# Patient Record
Sex: FEMALE | Race: OTHER | HISPANIC OR LATINO
[De-identification: names, ages, dates, MRNs, and addresses within clinical notes are randomized per-mention and may not be internally consistent; named-entity substitution may affect disease eponyms.]

---

## 2017-03-08 ENCOUNTER — APPOINTMENT (OUTPATIENT)
Dept: PULMONOLOGY | Facility: CLINIC | Age: 37
End: 2017-03-08

## 2017-03-08 VITALS
WEIGHT: 184 LBS | SYSTOLIC BLOOD PRESSURE: 112 MMHG | BODY MASS INDEX: 33.65 KG/M2 | HEART RATE: 88 BPM | OXYGEN SATURATION: 98 % | DIASTOLIC BLOOD PRESSURE: 66 MMHG

## 2017-03-08 DIAGNOSIS — R09.82 POSTNASAL DRIP: ICD-10-CM

## 2017-07-12 ENCOUNTER — RX ONLY (RX ONLY)
Age: 37
End: 2017-07-12

## 2017-08-24 ENCOUNTER — RX ONLY (RX ONLY)
Age: 37
End: 2017-08-24

## 2017-09-08 ENCOUNTER — RX ONLY (RX ONLY)
Age: 37
End: 2017-09-08

## 2017-09-13 ENCOUNTER — RX ONLY (RX ONLY)
Age: 37
End: 2017-09-13

## 2017-09-22 ENCOUNTER — RX ONLY (RX ONLY)
Age: 37
End: 2017-09-22

## 2017-11-08 ENCOUNTER — APPOINTMENT (OUTPATIENT)
Dept: PULMONOLOGY | Facility: CLINIC | Age: 37
End: 2017-11-08

## 2017-11-22 ENCOUNTER — APPOINTMENT (OUTPATIENT)
Dept: PULMONOLOGY | Facility: CLINIC | Age: 37
End: 2017-11-22
Payer: COMMERCIAL

## 2017-11-22 VITALS
OXYGEN SATURATION: 98 % | BODY MASS INDEX: 35.48 KG/M2 | DIASTOLIC BLOOD PRESSURE: 72 MMHG | HEART RATE: 79 BPM | WEIGHT: 194 LBS | SYSTOLIC BLOOD PRESSURE: 108 MMHG | RESPIRATION RATE: 16 BRPM

## 2017-11-22 DIAGNOSIS — R91.1 SOLITARY PULMONARY NODULE: ICD-10-CM

## 2017-11-22 DIAGNOSIS — J30.9 ALLERGIC RHINITIS, UNSPECIFIED: ICD-10-CM

## 2017-11-22 DIAGNOSIS — R06.02 SHORTNESS OF BREATH: ICD-10-CM

## 2017-11-22 DIAGNOSIS — R05 COUGH: ICD-10-CM

## 2017-11-22 DIAGNOSIS — E66.9 OBESITY, UNSPECIFIED: ICD-10-CM

## 2017-11-22 PROCEDURE — 99214 OFFICE O/P EST MOD 30 MIN: CPT

## 2017-12-20 ENCOUNTER — RX ONLY (RX ONLY)
Age: 37
End: 2017-12-20

## 2018-01-11 ENCOUNTER — RX ONLY (RX ONLY)
Age: 38
End: 2018-01-11

## 2018-07-16 ENCOUNTER — APPOINTMENT (OUTPATIENT)
Dept: VASCULAR SURGERY | Facility: CLINIC | Age: 38
End: 2018-07-16

## 2018-07-18 ENCOUNTER — APPOINTMENT (OUTPATIENT)
Dept: DERMATOLOGY | Facility: CLINIC | Age: 38
End: 2018-07-18
Payer: COMMERCIAL

## 2018-07-18 VITALS
BODY MASS INDEX: 31.89 KG/M2 | SYSTOLIC BLOOD PRESSURE: 100 MMHG | HEIGHT: 63 IN | DIASTOLIC BLOOD PRESSURE: 72 MMHG | WEIGHT: 180 LBS

## 2018-07-18 DIAGNOSIS — Z91.89 OTHER SPECIFIED PERSONAL RISK FACTORS, NOT ELSEWHERE CLASSIFIED: ICD-10-CM

## 2018-07-18 DIAGNOSIS — D23.9 OTHER BENIGN NEOPLASM OF SKIN, UNSPECIFIED: ICD-10-CM

## 2018-07-18 PROCEDURE — 99202 OFFICE O/P NEW SF 15 MIN: CPT

## 2018-11-08 ENCOUNTER — APPOINTMENT (OUTPATIENT)
Dept: PHYSICAL MEDICINE AND REHAB | Facility: CLINIC | Age: 38
End: 2018-11-08

## 2019-07-25 ENCOUNTER — APPOINTMENT (OUTPATIENT)
Dept: RHEUMATOLOGY | Facility: CLINIC | Age: 39
End: 2019-07-25
Payer: COMMERCIAL

## 2019-07-25 VITALS
BODY MASS INDEX: 32.6 KG/M2 | WEIGHT: 184 LBS | TEMPERATURE: 98.6 F | OXYGEN SATURATION: 98 % | HEIGHT: 63 IN | RESPIRATION RATE: 17 BRPM | DIASTOLIC BLOOD PRESSURE: 64 MMHG | SYSTOLIC BLOOD PRESSURE: 118 MMHG | HEART RATE: 65 BPM

## 2019-07-25 DIAGNOSIS — Z82.61 FAMILY HISTORY OF ARTHRITIS: ICD-10-CM

## 2019-07-25 DIAGNOSIS — Z82.49 FAMILY HISTORY OF ISCHEMIC HEART DISEASE AND OTHER DISEASES OF THE CIRCULATORY SYSTEM: ICD-10-CM

## 2019-07-25 DIAGNOSIS — Z83.3 FAMILY HISTORY OF DIABETES MELLITUS: ICD-10-CM

## 2019-07-25 DIAGNOSIS — R73.03 PREDIABETES.: ICD-10-CM

## 2019-07-25 PROCEDURE — 99205 OFFICE O/P NEW HI 60 MIN: CPT

## 2019-07-25 RX ORDER — SIMVASTATIN 20 MG/1
20 TABLET, FILM COATED ORAL
Refills: 0 | Status: COMPLETED | COMMUNITY
End: 2019-07-25

## 2019-07-25 RX ORDER — ERGOCALCIFEROL 1.25 MG/1
1.25 MG CAPSULE, LIQUID FILLED ORAL
Refills: 0 | Status: ACTIVE | COMMUNITY
Start: 2019-07-25

## 2019-07-25 RX ORDER — TRIAMCINOLONE ACETONIDE 1 MG/G
0.1 OINTMENT TOPICAL
Qty: 1 | Refills: 1 | Status: COMPLETED | COMMUNITY
Start: 2018-07-18 | End: 2019-07-25

## 2019-07-25 NOTE — REASON FOR VISIT
[Consultation] : a consultation visit [Family Member] : family member [Patient Declined  Services] : - None: Patient declined  services

## 2019-07-25 NOTE — PHYSICAL EXAM
[General Appearance - Alert] : alert [General Appearance - In No Acute Distress] : in no acute distress [Sclera] : the sclera and conjunctiva were normal [Outer Ear] : the ears and nose were normal in appearance [Oropharynx] : the oropharynx was normal [Neck Appearance] : the appearance of the neck was normal [Neck Cervical Mass (___cm)] : no neck mass was observed [Jugular Venous Distention Increased] : there was no jugular-venous distention [Thyroid Diffuse Enlargement] : the thyroid was not enlarged [Thyroid Nodule] : there were no palpable thyroid nodules [Auscultation Breath Sounds / Voice Sounds] : lungs were clear to auscultation bilaterally [Heart Rate And Rhythm] : heart rate was normal and rhythm regular [Heart Sounds] : normal S1 and S2 [Heart Sounds Gallop] : no gallops [Murmurs] : no murmurs [Heart Sounds Pericardial Friction Rub] : no pericardial rub [Edema] : there was no peripheral edema [Bowel Sounds] : normal bowel sounds [Abdomen Soft] : soft [Abdomen Tenderness] : non-tender [Abdomen Mass (___ Cm)] : no abdominal mass palpated [Cervical Lymph Nodes Enlarged Posterior Bilaterally] : posterior cervical [Cervical Lymph Nodes Enlarged Anterior Bilaterally] : anterior cervical [Supraclavicular Lymph Nodes Enlarged Bilaterally] : supraclavicular [Skin Color & Pigmentation] : normal skin color and pigmentation [Skin Turgor] : normal skin turgor [] : no rash [No Focal Deficits] : no focal deficits [Oriented To Time, Place, And Person] : oriented to person, place, and time [Impaired Insight] : insight and judgment were intact [Affect] : the affect was normal [FreeTextEntry1] : No synovitis, (+)tenderness in right 2nd PIP;  full ROM in all joints;  (+)tenderness over left lateral epicondyle, (+)pain upon resisted wrist extension\par

## 2019-07-25 NOTE — HISTORY OF PRESENT ILLNESS
[Fatigue] : fatigue [Arthralgias] : arthralgias [Myalgias] : myalgias [FreeTextEntry1] : 38 year old female with PMHx as listed below reports that she has been experiencing diffuse "bone" pains for the past 2-3 years.  The pain is is constant, though worse with activity and better at rest.  She says the pain severe - 9 out of 10.  (+)intermittent swelling in her arms and legs.  (+)AM stiffness, usually lasting for several minutes.  She previously saw another rheumatologist, who diagnosed her with fibromyalgia.  She was treated with naproxen and gabapentin, but neither provided any relief.  She gets some temporary relief from Icy Hot.  No other known alleviating factors.\par In addition, pt c/o burning pain from her elbows down to her hands.  She also reports that she often has difficulty grasping objects in her hands because the arms feel "loose". \par Pt also c/o mid back pain for the past year.  The pain is constant, though worse with sitting or standing/walking and better with lying down.  No radiation.  (+)numbness/tingling in her B/L LE's.\par No F/C, no unintentional weight loss, no night sweats, no oral ulcers, no rashes, no alopecia, no photosensitivity, no dry eyes; (+)dry mouth, no Raynaud symptoms, no focal weakness, no dysphagia  [Anorexia] : no anorexia [Weight Loss] : no weight loss [Malaise] : no malaise [Fever] : no fever [Chills] : no chills [Malar Facial Rash] : no malar facial rash [Skin Lesions] : no lesions [Skin Nodules] : no skin nodules [Oral Ulcers] : no oral ulcers [Cough] : no cough [Dry Mouth] : no dry mouth [Dysphagia] : no dysphagia [Shortness of Breath] : no shortness of breath [Chest Pain] : no chest pain [Joint Swelling] : no joint swelling [Joint Warmth] : no joint warmth [Joint Deformity] : no joint deformity [Decreased ROM] : no decreased range of motion [Morning Stiffness] : no morning stiffness [Falls] : no falls [Dyspnea] : no dyspnea [Muscle Weakness] : no muscle weakness [Muscle Spasms] : no muscle spasms [Muscle Cramping] : no muscle cramping [Visual Changes] : no visual changes [Eye Pain] : no eye pain [Eye Redness] : no eye redness [Dry Eyes] : no dry eyes

## 2019-07-25 NOTE — ASSESSMENT
[FreeTextEntry1] : 38 year old female presents with:\par 1)  Diffuse pain and persistent fatigue:  I agree that her presentation is most suggestive of fibromyalgia.  However, as this is a diagnosis of exclusion, I am ordering some bloodwork to rule out other possible etiologies (e.g. thyroid disease, Sjogren's (pt c/o dry mouth)).\par 2)  B/L elbow pain (L>R):  most c/w lateral epicondylitis\par   - Minimize activities placing stress on the elbow\par   - ibuprofen prn\par   - ice packs\par   - tennis elbow band\par 3)  Mid-back pain:  no red flag symptoms, but will order w/u given duration of symptoms\par   - x-rays of T-spine.

## 2019-07-25 NOTE — CONSULT LETTER
[Dear  ___] : Dear  [unfilled], [Consult Letter:] : I had the pleasure of evaluating your patient, [unfilled]. [Please see my note below.] : Please see my note below. [Consult Closing:] : Thank you very much for allowing me to participate in the care of this patient.  If you have any questions, please do not hesitate to contact me. [Sincerely,] : Sincerely, [FreeTextEntry3] : Mike Husain MD\par Rheumatology\par [facility]\par  of Medicine\par [employer] [facility] \par \par [address] [facility] [address]\par phone:  [phone]\par fax:      [phone]\par \par [address] [place] [address]\[place] [address]\par phone:  [phone]\par fax:      [phone]\par

## 2019-10-17 ENCOUNTER — APPOINTMENT (OUTPATIENT)
Dept: RHEUMATOLOGY | Facility: CLINIC | Age: 39
End: 2019-10-17
Payer: COMMERCIAL

## 2019-10-17 VITALS
RESPIRATION RATE: 17 BRPM | DIASTOLIC BLOOD PRESSURE: 70 MMHG | SYSTOLIC BLOOD PRESSURE: 116 MMHG | TEMPERATURE: 98.1 F | HEIGHT: 63 IN | OXYGEN SATURATION: 97 % | BODY MASS INDEX: 32.6 KG/M2 | WEIGHT: 184 LBS | HEART RATE: 72 BPM

## 2019-10-17 DIAGNOSIS — R52 PAIN, UNSPECIFIED: ICD-10-CM

## 2019-10-17 DIAGNOSIS — M79.7 FIBROMYALGIA: ICD-10-CM

## 2019-10-17 DIAGNOSIS — M77.12 LATERAL EPICONDYLITIS, LEFT ELBOW: ICD-10-CM

## 2019-10-17 DIAGNOSIS — M54.9 DORSALGIA, UNSPECIFIED: ICD-10-CM

## 2019-10-17 DIAGNOSIS — R53.83 OTHER FATIGUE: ICD-10-CM

## 2019-10-17 DIAGNOSIS — R68.2 DRY MOUTH, UNSPECIFIED: ICD-10-CM

## 2019-10-17 PROCEDURE — 99214 OFFICE O/P EST MOD 30 MIN: CPT

## 2019-10-17 RX ORDER — TOLTERODINE TARTARATE 2 MG/1
2 TABLET ORAL
Refills: 0 | Status: ACTIVE | COMMUNITY

## 2019-10-17 RX ORDER — PANTOPRAZOLE 40 MG/1
40 TABLET, DELAYED RELEASE ORAL
Refills: 0 | Status: ACTIVE | COMMUNITY
Start: 2019-10-17

## 2019-10-17 RX ORDER — OMEPRAZOLE 40 MG/1
40 CAPSULE, DELAYED RELEASE ORAL
Refills: 0 | Status: COMPLETED | COMMUNITY
Start: 2019-07-25 | End: 2019-10-17

## 2019-10-17 RX ORDER — CHLORHEXIDINE GLUCONATE 4 %
325 (65 FE) LIQUID (ML) TOPICAL
Refills: 0 | Status: ACTIVE | COMMUNITY

## 2019-10-17 RX ORDER — SOLIFENACIN SUCCINATE 10 MG/1
10 TABLET ORAL
Refills: 0 | Status: COMPLETED | COMMUNITY
Start: 2019-07-25 | End: 2019-10-17

## 2019-10-17 NOTE — ASSESSMENT
[FreeTextEntry1] : 39 year old female presents with:\par 1)  Diffuse pain and persistent fatigue: most c/w fibromyalgia.  w/u for other possible etiologies negative.\par   - Reiterated importance of exercise.\par   - Start Lyrica 75mg BID.  [facility] registry consulted - Reference #: [id number].  (previously didn't tolerate gabapentin).\par   - sleep hygiene.\par 2)  B/L elbow pain (L>R):  most c/w lateral epicondylitis\par   - Minimize activities placing stress on the elbow\par   - ibuprofen prn\par   - ice packs\par   - tennis elbow band\par 3)  Mid-back pain:  no red flag symptoms, but will order w/u given duration of symptoms\par   - Back exercises.\par   - ibuprofen prn\par   - warm compresses\par   - OTC topical analgesics\par   - If no improvement by next visit, will consider MRI.

## 2019-10-17 NOTE — REASON FOR VISIT
[Follow-Up: _____] : a [unfilled] follow-up visit [Family Member] : family member [Patient Declined  Services] : - None: Patient declined  services

## 2019-10-17 NOTE — HISTORY OF PRESENT ILLNESS
[Fatigue] : fatigue [Arthralgias] : arthralgias [Myalgias] : myalgias [FreeTextEntry1] : Feeling "the same" since last visit.  Still w/ diffuse pain, worst in the mid-back, unchanged.  Pt reports that the pain makes it difficult to perform ADL's.  Has not yet tried exercising.  Also still w/ right elbow pain, unchanged. [Anorexia] : no anorexia [Weight Loss] : no weight loss [Malaise] : no malaise [Fever] : no fever [Malar Facial Rash] : no malar facial rash [Chills] : no chills [Skin Lesions] : no lesions [Skin Nodules] : no skin nodules [Oral Ulcers] : no oral ulcers [Cough] : no cough [Dry Mouth] : no dry mouth [Dysphagia] : no dysphagia [Shortness of Breath] : no shortness of breath [Chest Pain] : no chest pain [Joint Swelling] : no joint swelling [Joint Warmth] : no joint warmth [Joint Deformity] : no joint deformity [Decreased ROM] : no decreased range of motion [Falls] : no falls [Morning Stiffness] : no morning stiffness [Muscle Weakness] : no muscle weakness [Dyspnea] : no dyspnea [Muscle Cramping] : no muscle cramping [Muscle Spasms] : no muscle spasms [Eye Pain] : no eye pain [Visual Changes] : no visual changes [Eye Redness] : no eye redness [Dry Eyes] : no dry eyes

## 2019-10-17 NOTE — DATA REVIEWED
[FreeTextEntry1] : CBC, CMP unremarkable\par MARY negative\par SSA/SSB negative\par TSH 0.814\par ESR 16\par CRP 5 mg/L\par \par x-rays T-spine:  mild dextroscoliosis

## 2019-10-17 NOTE — PHYSICAL EXAM
[General Appearance - Alert] : alert [General Appearance - In No Acute Distress] : in no acute distress [Outer Ear] : the ears and nose were normal in appearance [Sclera] : the sclera and conjunctiva were normal [Oropharynx] : the oropharynx was normal [Neck Appearance] : the appearance of the neck was normal [Neck Cervical Mass (___cm)] : no neck mass was observed [Jugular Venous Distention Increased] : there was no jugular-venous distention [Thyroid Diffuse Enlargement] : the thyroid was not enlarged [Thyroid Nodule] : there were no palpable thyroid nodules [Auscultation Breath Sounds / Voice Sounds] : lungs were clear to auscultation bilaterally [Heart Sounds] : normal S1 and S2 [Heart Rate And Rhythm] : heart rate was normal and rhythm regular [Heart Sounds Gallop] : no gallops [Murmurs] : no murmurs [Heart Sounds Pericardial Friction Rub] : no pericardial rub [Bowel Sounds] : normal bowel sounds [Edema] : there was no peripheral edema [Abdomen Soft] : soft [Abdomen Tenderness] : non-tender [Abdomen Mass (___ Cm)] : no abdominal mass palpated [Cervical Lymph Nodes Enlarged Posterior Bilaterally] : posterior cervical [Cervical Lymph Nodes Enlarged Anterior Bilaterally] : anterior cervical [Supraclavicular Lymph Nodes Enlarged Bilaterally] : supraclavicular [] : no rash [Skin Color & Pigmentation] : normal skin color and pigmentation [Skin Turgor] : normal skin turgor [Oriented To Time, Place, And Person] : oriented to person, place, and time [No Focal Deficits] : no focal deficits [Impaired Insight] : insight and judgment were intact [Affect] : the affect was normal [FreeTextEntry1] : No synovitis, (+)tenderness in right 2nd PIP;  full ROM in all joints;  (+)tenderness over left lateral epicondyle, (+)pain upon resisted wrist extension\par

## 2020-02-18 ENCOUNTER — APPOINTMENT (OUTPATIENT)
Dept: RHEUMATOLOGY | Facility: CLINIC | Age: 40
End: 2020-02-18

## 2020-12-15 ENCOUNTER — APPOINTMENT (OUTPATIENT)
Dept: GYNECOLOGIC ONCOLOGY | Facility: CLINIC | Age: 40
End: 2020-12-15
Payer: COMMERCIAL

## 2020-12-15 VITALS
OXYGEN SATURATION: 99 % | HEIGHT: 63 IN | WEIGHT: 184 LBS | DIASTOLIC BLOOD PRESSURE: 80 MMHG | RESPIRATION RATE: 16 BRPM | SYSTOLIC BLOOD PRESSURE: 126 MMHG | HEART RATE: 62 BPM | BODY MASS INDEX: 32.6 KG/M2

## 2020-12-15 DIAGNOSIS — Z80.8 FAMILY HISTORY OF MALIGNANT NEOPLASM OF OTHER ORGANS OR SYSTEMS: ICD-10-CM

## 2020-12-15 DIAGNOSIS — N92.6 IRREGULAR MENSTRUATION, UNSPECIFIED: ICD-10-CM

## 2020-12-15 DIAGNOSIS — D25.9 LEIOMYOMA OF UTERUS, UNSPECIFIED: ICD-10-CM

## 2020-12-15 DIAGNOSIS — R10.2 PELVIC AND PERINEAL PAIN: ICD-10-CM

## 2020-12-15 PROCEDURE — 76830 TRANSVAGINAL US NON-OB: CPT | Mod: 59

## 2020-12-15 PROCEDURE — 99072 ADDL SUPL MATRL&STAF TM PHE: CPT

## 2020-12-15 PROCEDURE — 99205 OFFICE O/P NEW HI 60 MIN: CPT | Mod: 25

## 2020-12-15 PROCEDURE — 76857 US EXAM PELVIC LIMITED: CPT | Mod: 59

## 2020-12-15 RX ORDER — METFORMIN HYDROCHLORIDE 1000 MG/1
1000 TABLET, COATED ORAL
Refills: 0 | Status: DISCONTINUED | COMMUNITY
End: 2020-12-15

## 2020-12-15 RX ORDER — SOLIFENACIN SUCCINATE 10 MG/1
10 TABLET ORAL
Refills: 0 | Status: DISCONTINUED | COMMUNITY
Start: 2019-10-17 | End: 2020-12-15

## 2020-12-15 RX ORDER — PREGABALIN 75 MG/1
75 CAPSULE ORAL
Qty: 60 | Refills: 2 | Status: DISCONTINUED | COMMUNITY
Start: 2019-10-17 | End: 2020-12-15

## 2020-12-15 NOTE — HISTORY OF PRESENT ILLNESS
[FreeTextEntry1] : This 39yo ,  x 1, c/s x 2 LMP 20 referred by Dr. Moon for fibroids, dysmenorrhea and irregular menses. Pt reports menses occurring twice a month at times with abnormal spotting between periods. Complains of pelvic pressure, discomfort and urinary frequency with occasional leakage. Admits to occasional dyspareunia. Endometrial biopsy on 20 revealed benign endocervical glandular tissue with squamous metaplasia, non diagnostic endometrial tissue. Pt wants to discuss her options.\par \par Pap [id number] wnl \par Mammo-2020-reports wnl, cysts s/p FNA-benign \par  \par

## 2020-12-15 NOTE — ASSESSMENT
[FreeTextEntry1] : 39yo female with intermenstrual bleeding and irregular menses, fibroids and suspected adenomyosis. \par \par Discussed treatment options including D&C, hysteroscopy followed by either MIRENA IUD or RA RADHA MITCHELL. In the interim, pt will discuss options over with her . She understands the need for a D&C to rule out precancerous or cancerous cells. She understands that her pain may not be improved with the IUD and she may need a hysterectomy in the future. \par \par I discussed at length with the patient the nature, purpose, risks, benefits, and alternatives to dilation and curettage, hysteroscopy. She understands the risks to include (but not be limited to): uterine perforation with possible need for laparoscopy and/or laparotomy; infection with need for hospitalization; fluid overload with possible critical illness as a consequence; and bleeding with need for transfusion.  The patient agrees to proceed.\par

## 2020-12-15 NOTE — CHIEF COMPLAINT
[FreeTextEntry1] : [facility]\par \par [facility] [phone] at [address] [place] [id number]\par

## 2020-12-15 NOTE — PHYSICAL EXAM
[Abnormal] : Uterus: Abnormal [Tender] : tender [Normal] : Bimanual Exam: Normal [de-identified] : Patient was interviewed and examined with chaperone present. Name of chaperone: Candi Brown

## 2020-12-15 NOTE — END OF VISIT
[FreeTextEntry3] : Written by Candi BELLE, acting as a scribe for Dr. Xavier Cole.\par This note accurately reflects the work and decisions made by me.\par

## 2021-01-27 ENCOUNTER — FORM ENCOUNTER (OUTPATIENT)
Age: 41
End: 2021-01-27

## 2021-01-28 DIAGNOSIS — Z01.818 ENCOUNTER FOR OTHER PREPROCEDURAL EXAMINATION: ICD-10-CM

## 2021-02-03 ENCOUNTER — APPOINTMENT (OUTPATIENT)
Dept: DISASTER EMERGENCY | Facility: CLINIC | Age: 41
End: 2021-02-03

## 2021-04-01 ENCOUNTER — OUTPATIENT (OUTPATIENT)
Dept: OUTPATIENT SERVICES | Facility: HOSPITAL | Age: 41
LOS: 1 days | End: 2021-04-01
Payer: COMMERCIAL

## 2021-04-01 DIAGNOSIS — Z01.818 ENCOUNTER FOR OTHER PREPROCEDURAL EXAMINATION: ICD-10-CM

## 2021-04-01 LAB
ANION GAP SERPL CALC-SCNC: 12 MMOL/L — SIGNIFICANT CHANGE UP (ref 5–17)
BASOPHILS # BLD AUTO: 0.03 K/UL — SIGNIFICANT CHANGE UP (ref 0–0.2)
BASOPHILS NFR BLD AUTO: 0.6 % — SIGNIFICANT CHANGE UP (ref 0–2)
BUN SERPL-MCNC: 12 MG/DL — SIGNIFICANT CHANGE UP (ref 8–20)
CALCIUM SERPL-MCNC: 8.9 MG/DL — SIGNIFICANT CHANGE UP (ref 8.6–10.2)
CHLORIDE SERPL-SCNC: 104 MMOL/L — SIGNIFICANT CHANGE UP (ref 98–107)
CO2 SERPL-SCNC: 24 MMOL/L — SIGNIFICANT CHANGE UP (ref 22–29)
CREAT SERPL-MCNC: 0.52 MG/DL — SIGNIFICANT CHANGE UP (ref 0.5–1.3)
EOSINOPHIL # BLD AUTO: 0.05 K/UL — SIGNIFICANT CHANGE UP (ref 0–0.5)
EOSINOPHIL NFR BLD AUTO: 1.1 % — SIGNIFICANT CHANGE UP (ref 0–6)
GLUCOSE SERPL-MCNC: 95 MG/DL — SIGNIFICANT CHANGE UP (ref 70–99)
HCT VFR BLD CALC: 33.6 % — LOW (ref 34.5–45)
HGB BLD-MCNC: 10.7 G/DL — LOW (ref 11.5–15.5)
IMM GRANULOCYTES NFR BLD AUTO: 0.2 % — SIGNIFICANT CHANGE UP (ref 0–1.5)
LYMPHOCYTES # BLD AUTO: 2.31 K/UL — SIGNIFICANT CHANGE UP (ref 1–3.3)
LYMPHOCYTES # BLD AUTO: 48.7 % — HIGH (ref 13–44)
MCHC RBC-ENTMCNC: 24.8 PG — LOW (ref 27–34)
MCHC RBC-ENTMCNC: 31.8 GM/DL — LOW (ref 32–36)
MCV RBC AUTO: 77.8 FL — LOW (ref 80–100)
MONOCYTES # BLD AUTO: 0.38 K/UL — SIGNIFICANT CHANGE UP (ref 0–0.9)
MONOCYTES NFR BLD AUTO: 8 % — SIGNIFICANT CHANGE UP (ref 2–14)
NEUTROPHILS # BLD AUTO: 1.96 K/UL — SIGNIFICANT CHANGE UP (ref 1.8–7.4)
NEUTROPHILS NFR BLD AUTO: 41.4 % — LOW (ref 43–77)
PLATELET # BLD AUTO: 315 K/UL — SIGNIFICANT CHANGE UP (ref 150–400)
POTASSIUM SERPL-MCNC: 4 MMOL/L — SIGNIFICANT CHANGE UP (ref 3.5–5.3)
POTASSIUM SERPL-SCNC: 4 MMOL/L — SIGNIFICANT CHANGE UP (ref 3.5–5.3)
RBC # BLD: 4.32 M/UL — SIGNIFICANT CHANGE UP (ref 3.8–5.2)
RBC # FLD: 15.1 % — HIGH (ref 10.3–14.5)
SODIUM SERPL-SCNC: 140 MMOL/L — SIGNIFICANT CHANGE UP (ref 135–145)
WBC # BLD: 4.74 K/UL — SIGNIFICANT CHANGE UP (ref 3.8–10.5)
WBC # FLD AUTO: 4.74 K/UL — SIGNIFICANT CHANGE UP (ref 3.8–10.5)

## 2021-04-01 PROCEDURE — 84704 HCG FREE BETACHAIN TEST: CPT

## 2021-04-01 PROCEDURE — G0463: CPT

## 2021-04-01 PROCEDURE — 36415 COLL VENOUS BLD VENIPUNCTURE: CPT

## 2021-04-01 PROCEDURE — 80048 BASIC METABOLIC PNL TOTAL CA: CPT

## 2021-04-01 PROCEDURE — 85025 COMPLETE CBC W/AUTO DIFF WBC: CPT

## 2021-04-02 LAB — HCG-TM SERPL-ACNC: <1 MIU/ML — SIGNIFICANT CHANGE UP

## 2021-04-07 ENCOUNTER — APPOINTMENT (OUTPATIENT)
Dept: DISASTER EMERGENCY | Facility: CLINIC | Age: 41
End: 2021-04-07

## 2021-04-08 LAB — SARS-COV-2 N GENE NPH QL NAA+PROBE: NOT DETECTED

## 2021-04-12 ENCOUNTER — RESULT REVIEW (OUTPATIENT)
Age: 41
End: 2021-04-12

## 2021-05-04 ENCOUNTER — APPOINTMENT (OUTPATIENT)
Dept: GYNECOLOGIC ONCOLOGY | Facility: CLINIC | Age: 41
End: 2021-05-04
Payer: COMMERCIAL

## 2021-05-04 VITALS — HEIGHT: 63 IN | WEIGHT: 184 LBS | BODY MASS INDEX: 32.6 KG/M2

## 2021-05-04 DIAGNOSIS — N84.0 POLYP OF CORPUS UTERI: ICD-10-CM

## 2021-05-04 DIAGNOSIS — D25.0 SUBMUCOUS LEIOMYOMA OF UTERUS: ICD-10-CM

## 2021-05-04 PROCEDURE — 99212 OFFICE O/P EST SF 10 MIN: CPT

## 2021-05-04 PROCEDURE — 99072 ADDL SUPL MATRL&STAF TM PHE: CPT

## 2021-05-04 NOTE — ASSESSMENT
[FreeTextEntry1] : Pt is a 41 yo s/p resection of submucous leiomyoma and endometral polyps causing AUB. She reports improvement of her periods and would like to observe to see if she is happy with results. She will follow up if she desires additional treatment such as IUD mirena placement. We discussed importance of contraception as she can still get pregnant and she will think about options. She is using condoms and timed intercourse but she understands those are not as good. She recovered well after surgery. Pathology benign.

## 2021-05-04 NOTE — REASON FOR VISIT
[Post Op] : post op visit [de-identified] : 4/12/21 [de-identified] : D&C, myosure polypectomy and myomectomy [de-identified] : Pt presents for follow up. She reports she is feeling well and she reports having a period and it was heavy for only 2 days and normal the others, which is much better than previously.

## 2022-10-28 ENCOUNTER — RX ONLY (RX ONLY)
Age: 42
End: 2022-10-28

## 2022-10-28 ENCOUNTER — OFFICE (OUTPATIENT)
Dept: URBAN - METROPOLITAN AREA CLINIC 94 | Facility: CLINIC | Age: 42
Setting detail: OPHTHALMOLOGY
End: 2022-10-28
Payer: COMMERCIAL

## 2022-10-28 DIAGNOSIS — H04.123: ICD-10-CM

## 2022-10-28 DIAGNOSIS — E11.9: ICD-10-CM

## 2022-10-28 DIAGNOSIS — H43.393: ICD-10-CM

## 2022-10-28 DIAGNOSIS — Z96.1: ICD-10-CM

## 2022-10-28 PROCEDURE — 92250 FUNDUS PHOTOGRAPHY W/I&R: CPT | Performed by: OPHTHALMOLOGY

## 2022-10-28 PROCEDURE — 99204 OFFICE O/P NEW MOD 45 MIN: CPT | Performed by: OPHTHALMOLOGY

## 2022-10-28 ASSESSMENT — CONFRONTATIONAL VISUAL FIELD TEST (CVF)
OS_FINDINGS: FULL
OD_FINDINGS: FULL

## 2022-10-28 ASSESSMENT — KERATOMETRY
OD_K1POWER_DIOPTERS: 40.25
OS_AXISANGLE_DEGREES: 077
OD_AXISANGLE_DEGREES: 101
OS_K2POWER_DIOPTERS: 46.00
OD_K2POWER_DIOPTERS: 45.75
METHOD_AUTO_MANUAL: AUTO
OS_K1POWER_DIOPTERS: 39.75

## 2022-10-28 ASSESSMENT — REFRACTION_AUTOREFRACTION
OD_SPHERE: -0.25
OS_CYLINDER: -1.25
OS_SPHERE: -0.50
OD_AXIS: 163
OD_CYLINDER: -0.50
OS_AXIS: 008

## 2022-10-28 ASSESSMENT — REFRACTION_CURRENTRX
OD_OVR_VA: 20/
OD_CYLINDER: -6.75
OS_CYLINDER: -7.00
OS_SPHERE: +5.25
OD_VPRISM_DIRECTION: SV
OS_AXIS: 162
OS_VPRISM_DIRECTION: SV
OS_OVR_VA: 20/
OD_SPHERE: +5.50
OD_AXIS: 015

## 2022-10-28 ASSESSMENT — SUPERFICIAL PUNCTATE KERATITIS (SPK)
OD_SPK: T
OS_SPK: T

## 2022-10-28 ASSESSMENT — LID POSITION - COMMENTS
OS_COMMENTS: 10   BROW PTOSIS  8  MM, 3+ DERMATOCHALSIS, TEMPORAL HOODING
OD_COMMENTS: 10   BROW PTOSIS  8  MM, 3+ DERMATOCHALSIS, TEMPORAL HOODING
OD_COMMENTS: LEVATOR DEHISANCE.    MRD 1:  2 IPD:7   ULE:  &GT
OS_COMMENTS: LEVATOR DEHISANCE.    MRD 1:  2 IPD:7   ULE:  &GT

## 2022-10-28 ASSESSMENT — AXIALLENGTH_DERIVED
OS_AL: 24.2278
OS_AL: 24.3309
OD_AL: 23.9765
OD_AL: 24.7556
OS_AL: 24.2792

## 2022-10-28 ASSESSMENT — REFRACTION_MANIFEST
OS_VA1: 20/30-
OS_AXIS: 145
OD_VA1: 20/30
OD_SPHERE: +0.50
OS_AXIS: 142
OS_CYLINDER: -4.00
OS_VA1: 20/25
OD_CYLINDER: -5.75
OS_CYLINDER: -3.50
OS_SPHERE: +0.75
OS_SPHERE: +0.75
OD_AXIS: 10

## 2022-10-28 ASSESSMENT — SPHEQUIV_DERIVED
OD_SPHEQUIV: -2.375
OS_SPHEQUIV: -1
OS_SPHEQUIV: -1.25
OD_SPHEQUIV: -0.5
OS_SPHEQUIV: -1.125

## 2022-10-28 ASSESSMENT — VISUAL ACUITY
OD_BCVA: 20/40-1
OS_BCVA: 20/40

## 2022-10-28 ASSESSMENT — TONOMETRY
OD_IOP_MMHG: 13
OS_IOP_MMHG: 15

## 2023-04-28 ENCOUNTER — EMERGENCY (EMERGENCY)
Facility: HOSPITAL | Age: 43
LOS: 1 days | Discharge: HOME | End: 2023-04-28
Attending: STUDENT IN AN ORGANIZED HEALTH CARE EDUCATION/TRAINING PROGRAM
Payer: COMMERCIAL

## 2023-04-28 VITALS
RESPIRATION RATE: 18 BRPM | OXYGEN SATURATION: 100 % | HEART RATE: 76 BPM | SYSTOLIC BLOOD PRESSURE: 102 MMHG | DIASTOLIC BLOOD PRESSURE: 89 MMHG | HEIGHT: 63 IN | TEMPERATURE: 98 F | WEIGHT: 179.9 LBS

## 2023-04-28 LAB
ALBUMIN SERPL ELPH-MCNC: 4.4 G/DL — SIGNIFICANT CHANGE UP (ref 3.3–5.2)
ALP SERPL-CCNC: 71 U/L — SIGNIFICANT CHANGE UP (ref 40–120)
ALT FLD-CCNC: 13 U/L — SIGNIFICANT CHANGE UP
ANION GAP SERPL CALC-SCNC: 12 MMOL/L — SIGNIFICANT CHANGE UP (ref 5–17)
APPEARANCE UR: CLEAR — SIGNIFICANT CHANGE UP
AST SERPL-CCNC: 16 U/L — SIGNIFICANT CHANGE UP
BACTERIA # UR AUTO: ABNORMAL
BASOPHILS # BLD AUTO: 0.05 K/UL — SIGNIFICANT CHANGE UP (ref 0–0.2)
BASOPHILS NFR BLD AUTO: 0.8 % — SIGNIFICANT CHANGE UP (ref 0–2)
BILIRUB SERPL-MCNC: 0.3 MG/DL — LOW (ref 0.4–2)
BILIRUB UR-MCNC: NEGATIVE — SIGNIFICANT CHANGE UP
BUN SERPL-MCNC: 10 MG/DL — SIGNIFICANT CHANGE UP (ref 8–20)
CALCIUM SERPL-MCNC: 8.9 MG/DL — SIGNIFICANT CHANGE UP (ref 8.4–10.5)
CHLORIDE SERPL-SCNC: 103 MMOL/L — SIGNIFICANT CHANGE UP (ref 96–108)
CO2 SERPL-SCNC: 23 MMOL/L — SIGNIFICANT CHANGE UP (ref 22–29)
COLOR SPEC: YELLOW — SIGNIFICANT CHANGE UP
CREAT SERPL-MCNC: 0.58 MG/DL — SIGNIFICANT CHANGE UP (ref 0.5–1.3)
DIFF PNL FLD: ABNORMAL
EGFR: 114 ML/MIN/1.73M2 — SIGNIFICANT CHANGE UP
EOSINOPHIL # BLD AUTO: 0.03 K/UL — SIGNIFICANT CHANGE UP (ref 0–0.5)
EOSINOPHIL NFR BLD AUTO: 0.5 % — SIGNIFICANT CHANGE UP (ref 0–6)
EPI CELLS # UR: SIGNIFICANT CHANGE UP
GLUCOSE SERPL-MCNC: 100 MG/DL — HIGH (ref 70–99)
GLUCOSE UR QL: NEGATIVE MG/DL — SIGNIFICANT CHANGE UP
HCT VFR BLD CALC: 38.5 % — SIGNIFICANT CHANGE UP (ref 34.5–45)
HGB BLD-MCNC: 12.7 G/DL — SIGNIFICANT CHANGE UP (ref 11.5–15.5)
IMM GRANULOCYTES NFR BLD AUTO: 0.2 % — SIGNIFICANT CHANGE UP (ref 0–0.9)
KETONES UR-MCNC: NEGATIVE — SIGNIFICANT CHANGE UP
LEUKOCYTE ESTERASE UR-ACNC: NEGATIVE — SIGNIFICANT CHANGE UP
LYMPHOCYTES # BLD AUTO: 2.79 K/UL — SIGNIFICANT CHANGE UP (ref 1–3.3)
LYMPHOCYTES # BLD AUTO: 45.5 % — HIGH (ref 13–44)
MCHC RBC-ENTMCNC: 27.6 PG — SIGNIFICANT CHANGE UP (ref 27–34)
MCHC RBC-ENTMCNC: 33 GM/DL — SIGNIFICANT CHANGE UP (ref 32–36)
MCV RBC AUTO: 83.7 FL — SIGNIFICANT CHANGE UP (ref 80–100)
MONOCYTES # BLD AUTO: 0.43 K/UL — SIGNIFICANT CHANGE UP (ref 0–0.9)
MONOCYTES NFR BLD AUTO: 7 % — SIGNIFICANT CHANGE UP (ref 2–14)
NEUTROPHILS # BLD AUTO: 2.82 K/UL — SIGNIFICANT CHANGE UP (ref 1.8–7.4)
NEUTROPHILS NFR BLD AUTO: 46 % — SIGNIFICANT CHANGE UP (ref 43–77)
NITRITE UR-MCNC: NEGATIVE — SIGNIFICANT CHANGE UP
PH UR: 6 — SIGNIFICANT CHANGE UP (ref 5–8)
PLATELET # BLD AUTO: 222 K/UL — SIGNIFICANT CHANGE UP (ref 150–400)
POTASSIUM SERPL-MCNC: 3.8 MMOL/L — SIGNIFICANT CHANGE UP (ref 3.5–5.3)
POTASSIUM SERPL-SCNC: 3.8 MMOL/L — SIGNIFICANT CHANGE UP (ref 3.5–5.3)
PROT SERPL-MCNC: 7.2 G/DL — SIGNIFICANT CHANGE UP (ref 6.6–8.7)
PROT UR-MCNC: 30 MG/DL
RBC # BLD: 4.6 M/UL — SIGNIFICANT CHANGE UP (ref 3.8–5.2)
RBC # FLD: 13.8 % — SIGNIFICANT CHANGE UP (ref 10.3–14.5)
RBC CASTS # UR COMP ASSIST: SIGNIFICANT CHANGE UP /HPF (ref 0–4)
SODIUM SERPL-SCNC: 138 MMOL/L — SIGNIFICANT CHANGE UP (ref 135–145)
SP GR SPEC: 1.01 — SIGNIFICANT CHANGE UP (ref 1.01–1.02)
UROBILINOGEN FLD QL: NEGATIVE MG/DL — SIGNIFICANT CHANGE UP
WBC # BLD: 6.13 K/UL — SIGNIFICANT CHANGE UP (ref 3.8–10.5)
WBC # FLD AUTO: 6.13 K/UL — SIGNIFICANT CHANGE UP (ref 3.8–10.5)
WBC UR QL: SIGNIFICANT CHANGE UP /HPF (ref 0–5)

## 2023-04-28 PROCEDURE — 87086 URINE CULTURE/COLONY COUNT: CPT

## 2023-04-28 PROCEDURE — 76856 US EXAM PELVIC COMPLETE: CPT | Mod: 26

## 2023-04-28 PROCEDURE — 76830 TRANSVAGINAL US NON-OB: CPT

## 2023-04-28 PROCEDURE — 99284 EMERGENCY DEPT VISIT MOD MDM: CPT

## 2023-04-28 PROCEDURE — T1013: CPT

## 2023-04-28 PROCEDURE — 76830 TRANSVAGINAL US NON-OB: CPT | Mod: 26

## 2023-04-28 PROCEDURE — 80053 COMPREHEN METABOLIC PANEL: CPT

## 2023-04-28 PROCEDURE — 76856 US EXAM PELVIC COMPLETE: CPT

## 2023-04-28 PROCEDURE — 36415 COLL VENOUS BLD VENIPUNCTURE: CPT

## 2023-04-28 PROCEDURE — 85025 COMPLETE CBC W/AUTO DIFF WBC: CPT

## 2023-04-28 PROCEDURE — 96374 THER/PROPH/DIAG INJ IV PUSH: CPT

## 2023-04-28 PROCEDURE — 99284 EMERGENCY DEPT VISIT MOD MDM: CPT | Mod: 25

## 2023-04-28 PROCEDURE — 96375 TX/PRO/DX INJ NEW DRUG ADDON: CPT

## 2023-04-28 PROCEDURE — 81001 URINALYSIS AUTO W/SCOPE: CPT

## 2023-04-28 RX ORDER — KETOROLAC TROMETHAMINE 30 MG/ML
15 SYRINGE (ML) INJECTION ONCE
Refills: 0 | Status: DISCONTINUED | OUTPATIENT
Start: 2023-04-28 | End: 2023-04-28

## 2023-04-28 RX ORDER — ACETAMINOPHEN 500 MG
1000 TABLET ORAL ONCE
Refills: 0 | Status: COMPLETED | OUTPATIENT
Start: 2023-04-28 | End: 2023-04-28

## 2023-04-28 RX ADMIN — Medication 15 MILLIGRAM(S): at 10:26

## 2023-04-28 RX ADMIN — Medication 400 MILLIGRAM(S): at 10:28

## 2023-04-28 NOTE — ED STATDOCS - PROGRESS NOTE DETAILS
PT evaluated by intake physician. HPI/PE/ROS as noted above. Will follow up plan per intake physician Pt found to have L complex ovarian cyst and endometrial polyp on US which cancer cannot be excluded. advised need for close GYN f/u. Pt does not have GYN currently. She was advised to go to clinic in [place] and also will reach out to referrals coordinator to see if an appt can be set up. Pt advised of necessity of continued care to assess for cancer etc.  natacha. Abd soft and nontender on re-exam.

## 2023-04-28 NOTE — ED STATDOCS - OBJECTIVE STATEMENT
41 y/o female with PMHx of uterine fibroids presents to the ED c/o 3 days of lower abdomen pain, started on left side, gradually spread to suprapubic region, associated with dysuria, burning urination, flank pain. Pt also notes abnormal vaginal spotting as well.     : Stephanie

## 2023-04-28 NOTE — ED STATDOCS - PATIENT PORTAL LINK FT
You can access the FollowMyHealth Patient Portal offered by [facility] by registering at the following website: [email or web address]. By joining [facility]’s FollowMyHealth portal, you will also be able to view your health information using other applications (apps) compatible with our system.

## 2023-04-28 NOTE — ED STATDOCS - NSFOLLOWUPINSTRUCTIONS_ED_ALL_ED_FT
Infección urinaria en los adultos  Urinary Tract Infection, Adult       Yajaira infección urinaria (IU) puede ocurrir en cualquier lugar de las vías urinarias. Las vías urinarias incluyen a los riñones, los uréteres, la vejiga y la uretra. Estos órganos fabrican, almacenan y eliminan la orina del organismo.    El médico puede usar otras palabras para describir la infección. La IU diony afecta los uréteres y a los riñones (pielonefritis). La IU baja afecta la vejiga (cistitis) y la uretra (uretritis).    ¿Cuáles son las causas?  La mayoría de las infecciones de las vías urinarias es causada por la presencia de bacterias en la harley genital, alrededor de la entrada de las vías urinarias (uretra). Estas bacterias proliferan y causan inflamación en las vías urinarias.    ¿Qué incrementa el riesgo?  Es más probable que contraiga esta afección si:    Tiene colocado un catéter urinario (sonda urinaria) permanente.  No puede controlar cuándo orinar o defecar (tiene incontinencia).  Es abdiaziz y usted:    Utiliza espermicida o diafragma julian método anticonceptivo.  Tiene niveles bajos de estrógenos.  Están embarazadas.  Tiene ciertos genes que aumentan harris riesgo (genética).  Es sexualmente activa.  Anahi antibióticos.  Tiene yajaira afección que causa que el flujo de orina sea lento, julian:    Próstata agrandada, si usted es hombre.  Obstrucción de la uretra (estenosis).  Cálculo renal.  Yajaira afección nerviosa que afecta el control de la vejiga (vejiga neurógena).  No juan antonio lo suficiente o no orina con frecuencia.  Tiene ciertas enfermedades crónicas, julian:    Diabetes.  Un sistema que combate las enfermedades (sistemainmunitario) debilitado.  Anemia drepanocítica.  Gota.  Lesión en la médula smiley.    ¿Cuáles son los signos o los síntomas?  Los síntomas de esta afección incluyen:    Necesidad inmediata (urgente) de orinar.  Micción frecuente o eliminación de pequeñas cantidades de orina con frecuencia.  Ardor o dolor al orinar.  Presencia de chavez en la orina.  Orina con mal olor u olor atípico.  Dificultad para orinar.  Orina turbia.  Secreción vaginal, si es abdiaziz.  Dolor en el abdomen o en la parte inferior de la espalda.    Es posible que también tenga:    Vómitos o disminución del apetito.  Confusión.  Irritabilidad o cansancio.  Fiebre.  Diarrea.    El primer síntoma en los adultos mayores puede ser la confusión. En algunos casos, es posible que no tengan síntomas hasta que la infección empeore.    ¿Cómo se diagnostica?  Esta afección se diagnostica en función de mayo antecedentes médicos y de un examen físico. También pueden hacerle otras pruebas, incluidas las siguientes:    Análisis de orina.  Análisis de chavez.  Pruebas de infecciones de transmisión sexual (ITS).    Si ha tenido más de yajaira infección urinaria (IU), se pueden hacer estudios de diagnóstico por imágenes o yajaira cistoscopia para determinar la causa de las infecciones.    ¿Cómo se trata?  El tratamiento de esta afección incluye lo siguiente:    Antibióticos.  Medicamentos de venta keyur para aliviar las molestias.  Beber yajaira cantidad suficiente agua para mantenerse hidratado.    Si tiene infecciones con frecuencia o tiene otras afecciones, julian un cálculo renal, es posible que deba jose juan a un médico especialista en las vías urinarias (urólogo).    En casos poco frecuentes, las infecciones urinarias pueden provocar sepsis. La sepsis es yajaira afección potencialmente mortal que se produce cuando el cuerpo responde a yajaira infección. La sepsis se trata en el hospital con antibióticos, líquidos y otros medicamentos que se administran por vía intravenosa.    Sigue estas instrucciones en tu casa:         Medicamentos    Anahi los medicamentos de venta keyur y los recetados solamente julian se lo haya indicado el médico.  Si le recetaron un antibiótico, tómelo julian se lo haya indicado el médico. No deje de usar el antibiótico aunque comience a sentirse mejor.        Indicaciones generales    Asegúrese de hacer lo siguiente:    Vaciar la vejiga con frecuencia y en harris totalidad. No contener la orina laura largos períodos.  Vaciar la vejiga después de tener sexo.  Limpiarse de adelante hacia atrás después de defecar, si es abdiaziz. Use cada trozo de papel higiénico solo yajaira vez cuando se limpie.  Chaya suficiente líquido julian para mantener la orina de color amarillo pálido.  Concurrir a todas las visitas de seguimiento julian se lo haya indicado el médico. Playita es importante.    Comunícate con un médico si:  Los síntomas no mejoran después de 1 o 2 días de tratamiento.  Los síntomas desaparecen y luego vuelven a aparecer.    Solicite ayuda inmediatamente si tiene:  Dolor intenso en la espalda o en la parte inferior del abdomen.  Fiebre.  Náuseas o vómitos.    Resumen  Yajaira infección urinaria (IU) es yajaira infección en cualquier parte de las vías urinarias, que incluyen los riñones, los uréteres, la vejiga y la uretra.  La mayoría de las infecciones de las vías urinarias es causada por la presencia de bacterias en la harley genital, alrededor de la entrada de las vías urinarias (uretra).  El tratamiento de esta afección suele incluir antibióticos.  Si le recetaron un antibiótico, tómelo julian se lo haya indicado el médico. No deje de usar el antibiótico aunque comience a sentirse mejor.  Concurrir a todas las visitas de seguimiento julian se lo haya indicado el médico. Playita es importante.    NOTAS ADICIONALES E INSTRUCCIONES    Por favor tome los antibioticos.   Por favor tenga seguimiento con harris doctor primario entre 2 craig.  Regrese a urgencias por cualquier preocupacion medica. Follow up with [facility].  Take tylenol/motrin for pain.  Come back with new or worsening symptoms.    Kwame un seguimiento con la clínica de [facility].  [place] tylenol/motrin para el dolor.  Vuelve con síntomas nuevos o que empeoran.

## 2023-04-28 NOTE — ED STATDOCS - CLINICAL SUMMARY MEDICAL DECISION MAKING FREE TEXT BOX
43 y/o female with PMHx of fibroids present with dysuria, also reports abnormal bleeding requesting sonography, labs. US, UA, suspect cystitis vs lower UTI, differentials also include but less likely ovarian cyst, 41 y/o female with PMHx of fibroids present with dysuria, also reports abnormal bleeding requesting sonography, labs. US, UA, suspect cystitis vs lower UTI, differentials also include but less likely ovarian cyst,  PE-Gen: NAD, non-toxic, conversational  Eyes: PERRLA, EOMI   HENT: Normocephalic, atraumatic. External ears normal, no rhinorrhea, moist mucous membranes.   CV: RRR, no M/R/G  Resp: CTAB, non-labored, speaking without difficulty on room air  Abd: soft, non rigid, no guarding or rebound tenderness, + ttp suprapubic region  Back: No CVAT bilaterally, no midline ttp  Skin: dry, wwp   Neuro: AOx3, speech is fluent and appropriate  Psych: Mood okay, affect euthymic  Labs and US performed. US shows L ovarian complex cyst and endometrial polyp which cancer cannot be excluded. Pt advised of results and need for close GYN f/u. Pt does not have GYN. Advised to f/u with [facility] and info given to referrals coordinator to see if she can get an appt. Return precautions advised.

## 2023-04-28 NOTE — ED STATDOCS - NS ED ATTENDING STATEMENT MOD
This was a shared visit with the DEBBY. I reviewed and verified the documentation and independently performed the documented:

## 2023-04-28 NOTE — ED STATDOCS - PHYSICAL EXAMINATION
Gen: NAD, non-toxic, conversational  Eyes: PERRLA, EOMI   HENT: Normocephalic, atraumatic. External ears normal, no rhinorrhea, moist mucous membranes.   CV: RRR, no M/R/G  Resp: CTAB, non-labored, speaking without difficulty on room air  Abd: soft, non rigid, no guarding or rebound tenderness, + ttp suprapubic region  Back: No CVAT bilaterally, no midline ttp  Skin: dry, wwp   Neuro: AOx3, speech is fluent and appropriate  Psych: Mood okay, affect euthymic

## 2023-04-29 LAB
CULTURE RESULTS: SIGNIFICANT CHANGE UP
SPECIMEN SOURCE: SIGNIFICANT CHANGE UP

## 2023-07-28 ENCOUNTER — OFFICE (OUTPATIENT)
Dept: URBAN - METROPOLITAN AREA CLINIC 94 | Facility: CLINIC | Age: 43
Setting detail: OPHTHALMOLOGY
End: 2023-07-28
Payer: COMMERCIAL

## 2023-07-28 DIAGNOSIS — E11.9: ICD-10-CM

## 2023-07-28 DIAGNOSIS — H04.123: ICD-10-CM

## 2023-07-28 DIAGNOSIS — H04.121: ICD-10-CM

## 2023-07-28 DIAGNOSIS — H04.122: ICD-10-CM

## 2023-07-28 DIAGNOSIS — Z96.1: ICD-10-CM

## 2023-07-28 DIAGNOSIS — H43.393: ICD-10-CM

## 2023-07-28 PROCEDURE — 92012 INTRM OPH EXAM EST PATIENT: CPT | Performed by: OPHTHALMOLOGY

## 2023-07-28 ASSESSMENT — REFRACTION_MANIFEST
OS_AXIS: 145
OD_SPHERE: +0.50
OD_CYLINDER: -5.75
OS_VA1: 20/30-
OD_VA1: 20/30
OS_SPHERE: +0.75
OS_AXIS: 142
OS_CYLINDER: -3.50
OD_AXIS: 10
OS_CYLINDER: -4.00
OS_VA1: 20/25
OS_SPHERE: +0.75

## 2023-07-28 ASSESSMENT — REFRACTION_CURRENTRX
OS_SPHERE: +5.25
OD_VPRISM_DIRECTION: SV
OS_VPRISM_DIRECTION: SV
OS_OVR_VA: 20/
OD_AXIS: 015
OS_AXIS: 162
OD_SPHERE: +5.50
OD_OVR_VA: 20/
OD_CYLINDER: -6.75
OS_CYLINDER: -7.00

## 2023-07-28 ASSESSMENT — REFRACTION_AUTOREFRACTION
OS_AXIS: 008
OD_SPHERE: -0.25
OD_AXIS: 168
OD_CYLINDER: -0.75
OS_CYLINDER: -1.25
OS_SPHERE: -0.50

## 2023-07-28 ASSESSMENT — KERATOMETRY
OD_AXISANGLE_DEGREES: 098
METHOD_AUTO_MANUAL: AUTO
OD_K1POWER_DIOPTERS: 40.25
OS_K2POWER_DIOPTERS: 46.25
OD_K2POWER_DIOPTERS: 45.75
OS_AXISANGLE_DEGREES: 075
OS_K1POWER_DIOPTERS: 39.75

## 2023-07-28 ASSESSMENT — VISUAL ACUITY
OD_BCVA: 20/40-1
OS_BCVA: 20/40

## 2023-07-28 ASSESSMENT — CONFRONTATIONAL VISUAL FIELD TEST (CVF)
OD_FINDINGS: FULL
OS_FINDINGS: FULL

## 2023-07-28 ASSESSMENT — AXIALLENGTH_DERIVED
OD_AL: 24.7556
OS_AL: 24.1794
OS_AL: 24.2307
OS_AL: 24.2822
OD_AL: 24.0269

## 2023-07-28 ASSESSMENT — LID POSITION - COMMENTS
OS_COMMENTS: LEVATOR DEHISANCE.    MRD 1:  2 IPD:7   ULE:  &GT
OS_COMMENTS: 10   BROW PTOSIS  8  MM, 3+ DERMATOCHALSIS, TEMPORAL HOODING
OD_COMMENTS: LEVATOR DEHISANCE.    MRD 1:  2 IPD:7   ULE:  &GT
OD_COMMENTS: 10   BROW PTOSIS  8  MM, 3+ DERMATOCHALSIS, TEMPORAL HOODING

## 2023-07-28 ASSESSMENT — SPHEQUIV_DERIVED
OS_SPHEQUIV: -1.125
OD_SPHEQUIV: -0.625
OS_SPHEQUIV: -1.25
OD_SPHEQUIV: -2.375
OS_SPHEQUIV: -1

## 2023-07-28 ASSESSMENT — TONOMETRY
OS_IOP_MMHG: 17
OD_IOP_MMHG: 14

## 2023-07-28 ASSESSMENT — SUPERFICIAL PUNCTATE KERATITIS (SPK)
OD_SPK: T
OS_SPK: T

## 2024-05-29 ENCOUNTER — OFFICE (OUTPATIENT)
Dept: URBAN - METROPOLITAN AREA CLINIC 94 | Facility: CLINIC | Age: 44
Setting detail: OPHTHALMOLOGY
End: 2024-05-29
Payer: COMMERCIAL

## 2024-05-29 DIAGNOSIS — H04.123: ICD-10-CM

## 2024-05-29 DIAGNOSIS — H43.393: ICD-10-CM

## 2024-05-29 PROCEDURE — 92250 FUNDUS PHOTOGRAPHY W/I&R: CPT | Performed by: OPHTHALMOLOGY

## 2024-05-29 PROCEDURE — 92014 COMPRE OPH EXAM EST PT 1/>: CPT | Performed by: OPHTHALMOLOGY

## 2024-05-29 ASSESSMENT — LID POSITION - COMMENTS
OS_COMMENTS: LEVATOR DEHISANCE.    MRD 1:  2 IPD:7   ULE:  &GT
OD_COMMENTS: LEVATOR DEHISANCE.    MRD 1:  2 IPD:7   ULE:  &GT
OD_COMMENTS: 10   BROW PTOSIS  8  MM, 3+ DERMATOCHALSIS, TEMPORAL HOODING
OS_COMMENTS: 10   BROW PTOSIS  8  MM, 3+ DERMATOCHALSIS, TEMPORAL HOODING

## 2024-05-29 ASSESSMENT — CONFRONTATIONAL VISUAL FIELD TEST (CVF)
OD_FINDINGS: FULL
OS_FINDINGS: FULL

## 2024-07-01 ENCOUNTER — APPOINTMENT (OUTPATIENT)
Dept: NEUROLOGY | Facility: CLINIC | Age: 44
End: 2024-07-01

## 2024-08-12 ENCOUNTER — APPOINTMENT (OUTPATIENT)
Dept: RHEUMATOLOGY | Facility: CLINIC | Age: 44
End: 2024-08-12
Payer: COMMERCIAL

## 2024-08-12 VITALS
BODY MASS INDEX: 33.31 KG/M2 | WEIGHT: 188 LBS | TEMPERATURE: 98 F | SYSTOLIC BLOOD PRESSURE: 128 MMHG | OXYGEN SATURATION: 98 % | HEIGHT: 63 IN | HEART RATE: 73 BPM | DIASTOLIC BLOOD PRESSURE: 76 MMHG

## 2024-08-12 DIAGNOSIS — M25.511 PAIN IN RIGHT SHOULDER: ICD-10-CM

## 2024-08-12 DIAGNOSIS — M79.673 PAIN IN UNSPECIFIED FOOT: ICD-10-CM

## 2024-08-12 DIAGNOSIS — G89.29 LOW BACK PAIN, UNSPECIFIED: ICD-10-CM

## 2024-08-12 DIAGNOSIS — M25.512 PAIN IN RIGHT SHOULDER: ICD-10-CM

## 2024-08-12 DIAGNOSIS — M79.671 PAIN IN RIGHT FOOT: ICD-10-CM

## 2024-08-12 DIAGNOSIS — M79.672 PAIN IN RIGHT FOOT: ICD-10-CM

## 2024-08-12 DIAGNOSIS — M54.50 LOW BACK PAIN, UNSPECIFIED: ICD-10-CM

## 2024-08-12 DIAGNOSIS — G89.29 PAIN IN RIGHT SHOULDER: ICD-10-CM

## 2024-08-12 DIAGNOSIS — M25.50 PAIN IN UNSPECIFIED JOINT: ICD-10-CM

## 2024-08-12 PROCEDURE — 99204 OFFICE O/P NEW MOD 45 MIN: CPT

## 2024-08-12 RX ORDER — SIMVASTATIN 80 MG/1
TABLET, FILM COATED ORAL
Refills: 0 | Status: ACTIVE | COMMUNITY

## 2024-08-12 RX ORDER — METFORMIN HYDROCHLORIDE 625 MG/1
TABLET ORAL
Refills: 0 | Status: ACTIVE | COMMUNITY

## 2024-08-12 RX ADMIN — METHYLPREDNISOLONE 0 MG: 4 TABLET ORAL at 00:00

## 2024-08-12 NOTE — REVIEW OF SYSTEMS
[Fever] : no fever [Chills] : no chills [Eye Pain] : no eye pain [Red Eyes] : eyes not red [Nosebleeds] : no nosebleeds [Nasal Discharge] : no nasal discharge [Chest Pain] : no chest pain [Palpitations] : no palpitations [Cough] : no cough [SOB on Exertion] : no shortness of breath during exertion

## 2024-08-12 NOTE — HISTORY OF PRESENT ILLNESS
[FreeTextEntry1] : 42 yo obesity, pre-DM ( on metformin) , HLD, migraines, gastritis presents for whole body pain.  Patient previously seen by Dr Husain and was followed for fibromyalgia.  Patient presents with complaints of pain of her back, feet pain and arms pain  states that foot pain is worse when standing or walking.  feels burning and numbness of the right foot.  left foot pain is over the heel with apparently swelling at a times.    back pain mid to lower back.  no back pain at night.  worse with prolong sitting and washing dishes.  no radicular pain.  no trauma, falls, accidents.  No urinary incontinence and no saddle anesthesia   left more than right arm pain.  she refers to the soft tissue of her upper arm.  states that her arm gets swollen and tender to palpation.  she has difficulty taking off and putting on shirts.    [place] which contains diclofenac with some pain relief but only temporary   she was previously given multiple drugs for fibro: gabapentin/lyrica with no pain relief.  she also mentions some injections that may have helped.     ROS:  dry eye -had corneal implant  history of colitis ??? not on treatment but followed by GI  GERD   denies any alopecia, oral lesions, persistent dry mouth, red painful eye, nose bleeding, sinusitis/ear infections, dysphagia, hoarseness, facial rashes, photosensitivity, sob, cough, cp, hx of serositis, hx low wbc, plts or anemia that required special treatment, Raynaud's, weakness, DVt/PE, miscarriages.  had 3 pregnancies-1st pregnancy delivered at 7 months.   no pre-eclampsia  denies psoriasis, nail changes, Sauage digits, tennis elbow, de Quervain, plantar fasciitis, Achilles pain, back pain, FH of psoriasis, UC or Crohn's    PMH: as above  Surgery: 2 c-sections, cornea implant  Allergy: NKDA  MEDs: simvastatin, metformin  FH: grandmother had arthritis? 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

## 2024-08-12 NOTE — PHYSICAL EXAM
[General Appearance - Well Nourished] : well nourished [General Appearance - Well Developed] : well developed [Sclera] : the sclera and conjunctiva were normal [Hearing Threshold Finger Rub Not [place]] : hearing was normal [Abnormal Walk] : normal gait [Musculoskeletal - Swelling] : no joint swelling seen [Motor Tone] : muscle strength and tone were normal [FreeTextEntry1] : hallus valgus bilateral.  tender left heel and left medial swelling with exquisite tenderness, patient tender with squeeze test of the feet.  she has many fibro trigger points: pes anserine, GTB, ant chest, left [id number] impingement,  [] : no rash [Skin Lesions] : no skin lesions [Affect] : the affect was normal [Mood] : the mood was normal

## 2024-08-12 NOTE — ASSESSMENT
[FreeTextEntry1] : 44 yo woman with history of obesity, pre-DM ( on  metformine) , gastritis present with whole body pain for multiple years./  previously seen by Dr Husain and thought to have fibromyalgia.  current with many fibro trigger points, left rotator cuff syndrome, foot pain and back pain   --will check serologies  --will get xrays --she is to obtain GI documents regarding colitis  --medrol trial  --rotator cuff syndrome: start PT  --foot pain : get xrays and see ortho

## 2024-08-20 LAB
ALBUMIN SERPL ELPH-MCNC: 4.3 G/DL
ALP BLD-CCNC: 80 U/L
ALT SERPL-CCNC: 33 U/L
ANION GAP SERPL CALC-SCNC: 13 MMOL/L
AST SERPL-CCNC: 26 U/L
BASOPHILS # BLD AUTO: 0.04 K/UL
BASOPHILS NFR BLD AUTO: 0.8 %
BILIRUB SERPL-MCNC: 0.3 MG/DL
BUN SERPL-MCNC: 10 MG/DL
CALCIUM SERPL-MCNC: 9.1 MG/DL
CCP AB SER IA-ACNC: <8 U/ML
CHLORIDE SERPL-SCNC: 102 MMOL/L
CK SERPL-CCNC: 112 U/L
CO2 SERPL-SCNC: 23 MMOL/L
CREAT SERPL-MCNC: 0.64 MG/DL
CREAT SPEC-SCNC: 31 MG/DL
CREAT/PROT UR: NORMAL RATIO
CRP SERPL-MCNC: 5 MG/L
DSDNA AB SER-ACNC: <1 IU/ML
EGFR: 110 ML/MIN/1.73M2
ENA RNP AB SER IA-ACNC: <0.2 AL
ENA SM AB SER IA-ACNC: <0.2 AL
ENA SS-A AB SER IA-ACNC: <0.2 AL
ENA SS-B AB SER IA-ACNC: <0.2 AL
EOSINOPHIL # BLD AUTO: 0.07 K/UL
EOSINOPHIL NFR BLD AUTO: 1.3 %
ERYTHROCYTE [SEDIMENTATION RATE] IN BLOOD BY WESTERGREN METHOD: 24 MM/HR
GLUCOSE SERPL-MCNC: 107 MG/DL
HCT VFR BLD CALC: 38.9 %
HGB BLD-MCNC: 12.4 G/DL
IMM GRANULOCYTES NFR BLD AUTO: 0.2 %
LYMPHOCYTES # BLD AUTO: 2.31 K/UL
LYMPHOCYTES NFR BLD AUTO: 43.8 %
MAN DIFF?: NORMAL
MCHC RBC-ENTMCNC: 27.8 PG
MCHC RBC-ENTMCNC: 31.9 GM/DL
MCV RBC AUTO: 87.2 FL
MONOCYTES # BLD AUTO: 0.49 K/UL
MONOCYTES NFR BLD AUTO: 9.3 %
NEUTROPHILS # BLD AUTO: 2.35 K/UL
NEUTROPHILS NFR BLD AUTO: 44.6 %
PLATELET # BLD AUTO: 255 K/UL
POTASSIUM SERPL-SCNC: 4.1 MMOL/L
PROT SERPL-MCNC: 6.8 G/DL
PROT UR-MCNC: <4 MG/DL
RBC # BLD: 4.46 M/UL
RBC # FLD: 13.4 %
RF+CCP IGG SER-IMP: NEGATIVE
RHEUMATOID FACT SER QL: <10 IU/ML
SODIUM SERPL-SCNC: 138 MMOL/L
T PALLIDUM AB SER QL IA: NEGATIVE
TSH SERPL-ACNC: 1.32 UIU/ML
WBC # FLD AUTO: 5.27 K/UL

## 2024-08-21 LAB — ANA SER IF-ACNC: NEGATIVE

## 2024-08-22 LAB — HLA-B27 QL NAA+PROBE: NORMAL

## 2024-08-23 ENCOUNTER — APPOINTMENT (OUTPATIENT)
Dept: RADIOLOGY | Facility: CLINIC | Age: 44
End: 2024-08-23
Payer: COMMERCIAL

## 2024-08-23 ENCOUNTER — OUTPATIENT (OUTPATIENT)
Dept: OUTPATIENT SERVICES | Facility: HOSPITAL | Age: 44
LOS: 1 days | End: 2024-08-23
Payer: COMMERCIAL

## 2024-08-23 DIAGNOSIS — M25.511 PAIN IN RIGHT SHOULDER: ICD-10-CM

## 2024-08-23 PROCEDURE — 73030 X-RAY EXAM OF SHOULDER: CPT | Mod: 26,LT,RT

## 2024-08-23 PROCEDURE — 72070 X-RAY EXAM THORAC SPINE 2VWS: CPT | Mod: 26

## 2024-08-23 PROCEDURE — 73030 X-RAY EXAM OF SHOULDER: CPT

## 2024-08-23 PROCEDURE — 72202 X-RAY EXAM SI JOINTS 3/> VWS: CPT | Mod: 26

## 2024-08-23 PROCEDURE — 72100 X-RAY EXAM L-S SPINE 2/3 VWS: CPT | Mod: 26

## 2024-08-23 PROCEDURE — 73620 X-RAY EXAM OF FOOT: CPT

## 2024-08-23 PROCEDURE — 73080 X-RAY EXAM OF ELBOW: CPT | Mod: 26,LT,RT

## 2024-08-23 PROCEDURE — 73080 X-RAY EXAM OF ELBOW: CPT

## 2024-08-23 PROCEDURE — 72100 X-RAY EXAM L-S SPINE 2/3 VWS: CPT

## 2024-08-23 PROCEDURE — 73620 X-RAY EXAM OF FOOT: CPT | Mod: 26,LT,RT

## 2024-08-23 PROCEDURE — 72202 X-RAY EXAM SI JOINTS 3/> VWS: CPT

## 2024-08-23 PROCEDURE — 72070 X-RAY EXAM THORAC SPINE 2VWS: CPT

## 2024-08-29 ENCOUNTER — APPOINTMENT (OUTPATIENT)
Dept: RHEUMATOLOGY | Facility: CLINIC | Age: 44
End: 2024-08-29
Payer: COMMERCIAL

## 2024-08-29 ENCOUNTER — APPOINTMENT (OUTPATIENT)
Dept: PHYSICAL MEDICINE AND REHAB | Facility: CLINIC | Age: 44
End: 2024-08-29
Payer: COMMERCIAL

## 2024-08-29 VITALS
WEIGHT: 188 LBS | HEIGHT: 63 IN | HEART RATE: 80 BPM | DIASTOLIC BLOOD PRESSURE: 71 MMHG | BODY MASS INDEX: 33.31 KG/M2 | RESPIRATION RATE: 15 BRPM | SYSTOLIC BLOOD PRESSURE: 105 MMHG

## 2024-08-29 VITALS
SYSTOLIC BLOOD PRESSURE: 112 MMHG | OXYGEN SATURATION: 98 % | HEIGHT: 63 IN | BODY MASS INDEX: 33.3 KG/M2 | DIASTOLIC BLOOD PRESSURE: 78 MMHG | HEART RATE: 71 BPM | TEMPERATURE: 98 F

## 2024-08-29 DIAGNOSIS — M79.671 PAIN IN RIGHT FOOT: ICD-10-CM

## 2024-08-29 DIAGNOSIS — M79.672 PAIN IN RIGHT FOOT: ICD-10-CM

## 2024-08-29 PROCEDURE — 99204 OFFICE O/P NEW MOD 45 MIN: CPT

## 2024-08-29 PROCEDURE — 99214 OFFICE O/P EST MOD 30 MIN: CPT

## 2024-08-29 RX ORDER — METHYLPREDNISOLONE 4 MG/1
4 TABLET ORAL
Qty: 1 | Refills: 0 | Status: ACTIVE | COMMUNITY
Start: 2024-08-29 | End: 1900-01-01

## 2024-08-29 NOTE — DATA REVIEWED
[Ultrasound] : ultrasound [FreeTextEntry1] : LEFT FOOT US (Oct 2022): In the region of palpable concern at the medial aspect of the midfoot bilaterally, no loculated fluid collection is appreciated within subcutaneous fat. There is no asymmetric subcutaneous edema or hyperemia. Prominence of the abductor hallucis muscle is suggested without discrete intramuscular mass. No calcification is identified. Consider pre and postcontrast MRI for further evaluation as clinically indicated.  There is bilateral first metatarsophalangeal joint osteoarthritis. Prominent spurring is identified in the region of palpable concern. There are no erosive changes. No significant joint effusion is identified. There is no asymmetric hyperemia. Correlation with radiographs is advised.  RIGHT FOOT US (Oct 2022): In the region of palpable concern at the medial aspect of the midfoot bilaterally, no loculated fluid collection is appreciated within subcutaneous fat. There is no asymmetric subcutaneous edema or hyperemia. Prominence of the abductor hallucis muscle is suggested without discrete intramuscular mass. No calcification is identified. Consider pre and postcontrast MRI for further evaluation as clinically indicated. There is bilateral first metatarsophalangeal joint osteoarthritis. Prominent spurring is identified in the region of palpable concern. There are no erosive changes. No significant joint effusion is identified. There is no asymmetric hyperemia. Correlation with radiographs is advised.

## 2024-08-29 NOTE — ASSESSMENT
[FreeTextEntry1] : 43 yo woman with history of obesity, pre-DM ( on metformin), gastritis present with whole body pain for multiple years. / Previously seen by Dr Husain and thought to have fibromyalgia.  currently with many fibro trigger points, left rotator cuff syndrome, foot pain and back pain.  overall serologies unrevealing     --awaiting xray results  --medrol trial  --rotator cuff syndrome: start PT  --foot pain-awaiting xray and will also start PT for her feet

## 2024-08-29 NOTE — PHYSICAL EXAM
[General Appearance - Well Nourished] : well nourished [General Appearance - Well Developed] : well developed [Sclera] : the sclera and conjunctiva were normal [Hearing Threshold Finger Rub Not [place]] : hearing was normal [Abnormal Walk] : normal gait [Musculoskeletal - Swelling] : no joint swelling seen [Motor Tone] : muscle strength and tone were normal [FreeTextEntry1] : hallus valgus bilateral.  tender left heel and left medial swelling with exquisite tenderness, patient tender with squeeze test of the feet.  she has many fibro trigger points: pes anserine, GTB, ant chest, left [id number] impingement, no gross synovitis [] : no rash [Skin Lesions] : no skin lesions [Affect] : the affect was normal [Mood] : the mood was normal

## 2024-08-29 NOTE — ASSESSMENT
[FreeTextEntry1] : 44 y.o. F w/ h/o pre-DM, HLD, gastritis and migraine MACDONALD presents to office w/ c/o bilateral foot pain for last 8 months (left > right).  I spent most of today's office visit (40 minutes) reviewing the patient's relevant imaging studies, discussing etiology, pathogenesis, further diagnostic workup and nonoperative management.  X-rays bilateral feet significant for first metatarsal phalangeal joint osteoarthrosis.  Prior ultrasound examination bilateral feet (2022) significant for hypertrophied abductor hallucis muscles without demonstrable mass, calcification or edema.  The patient does not appear to have symptoms suggestive of plantar fasciitis clinically.  She likely has a component of posterior tibialis tendon insufficiency on the left.  I provided her with a prescription for custom molded orthotics.  I also provided her with a prescription for physical therapy to focus on pain relieving modalities, gentle range of motion, stretching and strengthening exercises.  We may consider ultrasound-guided first MTP joint injections if necessary.  Patient is in agreement with the plan.  All questions have been answered.  Return to office 6 to 8 weeks.

## 2024-08-29 NOTE — HISTORY OF PRESENT ILLNESS
[FreeTextEntry1] : 45 yo obesity, pre-DM ( on metformin) , HLD, migraines, gastritis presents for whole body pain.  Patient previously seen by Dr Husain and was followed for fibromyalgia.  Patient presents with complaints of pain of her back, feet pain and arms pain  states that foot pain is worse when standing or walking.  feels burning and numbness of the right foot.  left foot pain is over the heel with apparently swelling at times.  she has pain over the bilateral hallux valgus.  had injection to heel with no improvement.   back pain mid to lower back.  no back pain at night.  worse with prolong sitting and washing dishes.  no radicular pain.  no trauma, falls, accidents.  No urinary incontinence and no saddle anesthesia   left more than right arm pain.  she refers to the soft tissue of her upper arm.  states that her arm gets swollen and tender to palpation.  she has difficulty taking off and putting on shirts.  she has pain on abduction   [place] which contains diclofenac with some pain relief but only temporary   she was previously given multiple drugs for fibro: gabapentin/lyrica with no pain relief.  she also mentions some injections that may have helped.    TODAY: on last viist she was offered medrol alex given complaints of elbow pain ( golfer elbow) , shoulder pain ( rotator cuff ) and foot pain, however this prescription did not make it to the pharmacy.  She states that last night she did not get any sleep due to diffuse pain.  she took Ibuprofen this morning and notices some improvement.  she also PM&R and was sent for foot PT.   Patient will start PT for her shoulders tomorrow.  she did xrays but these are not yet read by radiology  labs overall unrevealing  ESR 24 CRP 5  normal CBC/cmp  negative MARY, HLA b27, dsDNA, BRITTON, SJogrens, CCP, RF,  normal PCR normal TSH normal CPK  G6PD normal    ROS:  dry eye -had corneal implant  history of colitis ??? not on treatment but followed by GI.  last C-scope no mention of colitis  GERD   denies any alopecia, oral lesions, persistent dry mouth, red painful eye, nose bleeding, sinusitis/ear infections, dysphagia, hoarseness, facial rashes, photosensitivity, sob, cough, cp, hx of serositis, hx low wbc, plts or anemia that required special treatment, Raynaud's, weakness, DVt/PE, miscarriages.  had 3 pregnancies-1st pregnancy delivered at 7 months.   no pre-eclampsia  denies psoriasis, nail changes, Sauage digits, tennis elbow, de Quervain, plantar fasciitis, Achilles pain, back pain, FH of psoriasis, UC or Crohn's    PMH: as above  Surgery: 2 c-sections, cornea implant  Allergy: NKDA  MEDs: simvastatin, metformin  FH: grandmother had arthritis? 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

## 2024-08-29 NOTE — HISTORY OF PRESENT ILLNESS
[FreeTextEntry1] : 44 y.o. F w/ h/o pre-DM, HLD, gastritis and migraine MACDONALD presents to office w/ c/o bilateral foot pain for last 8 months (left > right).  Pain is largely in the heel.  Pain worse in AM and at end of the day.  Endorses sensation of warmth and numbness in right foot.  Endorses h/o mid back pain > LBP w/ intermittent sciatica-type pain in legs.  Has US examination b/l feet in 2022.  Pt. has not had P.T.  No orthotics.  No NSAIDs.  Pt. had CSI in left heel last year which was not helpful.

## 2024-09-05 ENCOUNTER — APPOINTMENT (OUTPATIENT)
Dept: RHEUMATOLOGY | Facility: CLINIC | Age: 44
End: 2024-09-05
Payer: COMMERCIAL

## 2024-09-05 VITALS
TEMPERATURE: 96.8 F | SYSTOLIC BLOOD PRESSURE: 118 MMHG | DIASTOLIC BLOOD PRESSURE: 68 MMHG | OXYGEN SATURATION: 99 % | HEIGHT: 63 IN | HEART RATE: 73 BPM

## 2024-09-05 PROCEDURE — 99214 OFFICE O/P EST MOD 30 MIN: CPT

## 2024-09-05 RX ORDER — CYCLOBENZAPRINE HYDROCHLORIDE 5 MG/1
5 TABLET, FILM COATED ORAL
Qty: 30 | Refills: 1 | Status: ACTIVE | COMMUNITY
Start: 2024-09-05 | End: 1900-01-01

## 2024-09-05 NOTE — ASSESSMENT
[FreeTextEntry1] : 43 yo woman with history of obesity, pre-DM ( on metformin), gastritis present with whole body pain for multiple years. / Previously seen by Dr Husain and thought to have fibromyalgia.  currently with many fibro trigger points, left rotator cuff syndrome, foot pain and back pain.  overall serologies unrevealing.   patient is noted to have S shaped thoracolumbar curvature and partial transitional lumbosacral anatomy  patient also with shoulder OA  back pain non responsive to steroids   --shoulder OA continue PT  --referral for back PT  --[facility] referral --pain management referral  --start flexeril  --declines gabapentin at this time

## 2024-09-05 NOTE — HISTORY OF PRESENT ILLNESS
[FreeTextEntry1] : 43 yo obesity, pre-DM ( on metformin) , HLD, migraines, gastritis presents for whole body pain.  Patient previously seen by Dr Husain and was followed for fibromyalgia.  Patient presents with complaints of pain of her back, feet pain and arms pain  states that foot pain is worse when standing or walking.  feels burning and numbness of the right foot.  left foot pain is located over the heel with apparently swelling at times.  she has pain over the bilateral hallux valgus.  had injection to heel with no improvement.   back pain mid to lower back.  no back pain at night.  worse with prolong sitting and washing dishes.  no radicular pain.  no trauma, falls, accidents.  No urinary incontinence and no saddle anesthesia.  in past tried PT fore spine but exaccerbated symptoms.  She has recent thoracolumbar xray and she is noted to have a S shaped thoracolumbar .  preserved disc space heights .  No sacroilitis.  partail transitional lumbosacral anatomy.    left more than right arm pain.  she refers to the soft tissue of her upper arm.  states that her arm gets swollen and tender to palpation.  she has difficulty taking off and putting on shirts.  she has pain on abduction.  nshoulder Xray shows moderate AC OA bilaterally and tiny calcification  suggestive of degenerative cuff calcification at the insertion  also golfer elbow  and foot pain bilateral with occasional burning and tingling.  back pain radiates to right leg on and off   [place] which contains diclofenac with some pain relief but only temporary   she was previously given multiple drugs for fibro: gabapentin/lyrica with no pain relief.  she also mentions some injections that may have helped.    TODAY: she took medrol alex given complaints of elbow pain (golfer elbow) , shoulder pain ( rotator cuff ) and foot pain, back pain.   It did nothing for her back pain.  she felt partial relief in her foot and shoulder pain.  she is currently doing PT for her shoulder with some improvement.  she is awaiting PT for her feet.  she complaints of occasiona burning and tingling of the feet and  down the right leg  she is noted to have AC OA bilaterally and bilateral hallus valgus.    labs overall unrevealing  ESR 24 CRP 5  normal CBC/cmp  negative MARY, HLA b27, dsDNA, BRITTON, SJogrens, CCP, RF,  normal PCR normal TSH normal CPK  G6PD normal    ROS:  dry eye -had corneal implant  history of colitis ??? not on treatment but followed by GI.  last C-scope no mention of colitis  GERD   denies any alopecia, oral lesions, persistent dry mouth, red painful eye, nose bleeding, sinusitis/ear infections, dysphagia, hoarseness, facial rashes, photosensitivity, sob, cough, cp, hx of serositis, hx low wbc, plts or anemia that required special treatment, Raynaud's, weakness, DVt/PE, miscarriages.  had 3 pregnancies-1st pregnancy delivered at 7 months.   no pre-eclampsia  denies psoriasis, nail changes, Sauage digits, tennis elbow, de Quervain, plantar fasciitis, Achilles pain, back pain, FH of psoriasis, UC or Crohn's    PMH: as above  Surgery: 2 c-sections, cornea implant  Allergy: NKDA  MEDs: simvastatin, metformin  FH: grandmother had arthritis? 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

## 2024-09-05 NOTE — PHYSICAL EXAM
[General Appearance - Well Nourished] : well nourished [General Appearance - Well Developed] : well developed [Sclera] : the sclera and conjunctiva were normal [Hearing Threshold Finger Rub Not [place]] : hearing was normal [Abnormal Walk] : normal gait [Musculoskeletal - Swelling] : no joint swelling seen [Motor Tone] : muscle strength and tone were normal [FreeTextEntry1] : hallus valgus bilateral.  tender left heel and left medial swelling with exquisite tenderness, patient tender with squeeze test of the feet.  she has many fibro trigger points: pes anserine, GTB, ant chest, left [id number] impingement, no gross synovitis,  [] : no rash [Skin Lesions] : no skin lesions [Affect] : the affect was normal [Mood] : the mood was normal

## 2024-09-19 ENCOUNTER — APPOINTMENT (OUTPATIENT)
Dept: ORTHOPEDIC SURGERY | Facility: CLINIC | Age: 44
End: 2024-09-19
Payer: COMMERCIAL

## 2024-09-19 VITALS
HEIGHT: 63 IN | BODY MASS INDEX: 32.96 KG/M2 | WEIGHT: 186 LBS | HEART RATE: 70 BPM | SYSTOLIC BLOOD PRESSURE: 106 MMHG | DIASTOLIC BLOOD PRESSURE: 71 MMHG

## 2024-09-19 DIAGNOSIS — G56.80 OTHER SPECIFIED MONONEUROPATHIES OF UNSPECIFIED UPPER LIMB: ICD-10-CM

## 2024-09-19 DIAGNOSIS — M79.7 FIBROMYALGIA: ICD-10-CM

## 2024-09-19 DIAGNOSIS — M67.952 UNSPECIFIED DISORDER OF SYNOVIUM AND TENDON, LEFT THIGH: ICD-10-CM

## 2024-09-19 DIAGNOSIS — M70.62 TROCHANTERIC BURSITIS, RIGHT HIP: ICD-10-CM

## 2024-09-19 DIAGNOSIS — M41.9 SCOLIOSIS, UNSPECIFIED: ICD-10-CM

## 2024-09-19 DIAGNOSIS — M54.6 PAIN IN THORACIC SPINE: ICD-10-CM

## 2024-09-19 DIAGNOSIS — M67.951 UNSPECIFIED DISORDER OF SYNOVIUM AND TENDON, RIGHT THIGH: ICD-10-CM

## 2024-09-19 DIAGNOSIS — M70.61 TROCHANTERIC BURSITIS, RIGHT HIP: ICD-10-CM

## 2024-09-19 DIAGNOSIS — M47.816 SPONDYLOSIS W/OUT MYELOPATHY OR RADICULOPATHY, LUMBAR REGION: ICD-10-CM

## 2024-09-19 PROCEDURE — 99204 OFFICE O/P NEW MOD 45 MIN: CPT

## 2024-09-19 RX ORDER — UBIDECARENONE/VIT E ACET 100MG-5
CAPSULE ORAL
Refills: 0 | Status: ACTIVE | COMMUNITY

## 2024-09-19 RX ORDER — MELOXICAM 15 MG/1
15 TABLET ORAL
Qty: 1 | Refills: 0 | Status: ACTIVE | COMMUNITY
Start: 2024-09-19 | End: 1900-01-01

## 2024-09-19 NOTE — HISTORY OF PRESENT ILLNESS
[de-identified] : 44-year-old female is here for evaluation of low back pain.  Patient sees rheumatologist for fibromyalgia.  Patient has low back pain, scapulothoracic area pain, pain at the base of her neck, lateral hip pain, gluteal pain worse with sitting or standing for long periods.  Pain of the bilateral shoulders on range of motion.  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  She is seeing rheumatology and diagnosed with fibromyalgia states that she was put on a Medrol Dosepak which helped somewhat to relieve her pain however she cannot take prednisone on a chronic basis.  She is not having any tingling numbness or burning down the legs.  She does have perceived weakness of the bilateral arms and shoulders intermittently.  There is no decrease in penmanship no decrease in dexterity and no ataxia.  Pain has been going on for the last year or so and has stayed the same through time.  It is made better by rest.  No change in bowel or bladder.  Past medical surgical social family allergy history is reviewed.

## 2024-09-19 NOTE — PHYSICAL EXAM
[de-identified] : CONSTITUTIONAL: The patient is a very pleasant individual who is well-nourished and who appears stated age. PSYCHIATRIC: The patient is alert and oriented X 3 and in no apparent distress, and participates with orthopedic evaluation well. HEAD: Atraumatic and is nonsyndromic in appearance. EENT: No visible thyromegaly, EOMI. RESPIRATORY: Respiratory rate is regular, not dyspneic on examination. LYMPHATICS: There is no inguinal lymphadenopathy INTEGUMENTARY: Skin is clean, dry, and intact about the bilateral lower extremities and lumbar spine. VASCULAR: There is brisk capillary refill about the bilateral lower extremities. NEUROLOGIC: There are no pathologic reflexes. There is no decrease in sensation of the bilateral lower extremities on manual examination. Deep tendon reflexes are well maintained at 2+/4 of the bilateral lower extremities and are symmetric.. MUSCULOSKELETAL: There is no visible muscular atrophy. Manual motor strength is well maintained in the bilateral lower extremities. Range of motion of lumbar and cervical spine is well maintained. The patient ambulates in a non-myelopathic manner. Negative tension sign and straight leg raise bilaterally. Quad extension, ankle dorsiflexion, EHL, plantar flexion, and ankle eversion are well preserved. Normal secondary orthopaedic exam of bilateral hips, knees and ankles  exam as highlighted by mechanical low back pain on flexion and extension.  Tenderness palpation of the bilateral greater trochanteric area bilateral gluteus consistent with gluteus tendinopathy.  Tenderness to the bilateral scapulothoracic borders consistent with scapulothoracic dysfunction.  Bilateral focal shoulder findings on extension flexion there is pain, bilateral subacromial bursitis. [de-identified] : X-ray of the lumbar spine thoracic spine AP lateral done at [facility] August 23, 2024 demonstrates a mild dextrocurvature of the thoracic spine with a normal thoracic kyphosis.  X-ray SI joints [facility] 8/23/2024 mild arthrosis bilaterally without any widening of the joint.  Lumbar spine x-ray unremarkable.  X-ray of bilateral shoulders 2 views done at [facility] August 23, 2024 demonstrate moderate AC joint arthrosis bilaterally, calcifications of the humerus on the left.

## 2024-09-19 NOTE — DISCUSSION/SUMMARY
[de-identified] : 30 minutes was spent reviewing the x-rays as well as discussing with the patient their clinical presentation, diagnosis and providing education.  Conservative treatment was discussed with the patient at length. Anticipatory guidance regarding disease process bilateral shoulder tendinitis, bilateral greater trochanteric bursitis, bilateral gluteus tendinopathy, fibromyalgia, scapulothoracic syndrome, thoracic and lumbar pain,, avoidance of acute exacerbation this was discussed at length and all patients commenting concerns were answered to the patient's satisfaction. Physical therapy for decrease pain and increase function was ordered. Patient was given home exercises as approved by [employer] and works well held directed toward this particular process. Intermittent use of acetaminophen 500 mg 2 tablets t.i.d. p.r.n. mild to moderate pain, meloxicam 15 mg once daily as needed for pain inflammation and can be continued by rheumatology or primary care provider if it is helpful in decreasing her pain.  She has hangover type symptoms after taking 5 mg of cyclobenzaprine I think she should focus more on stretching and heat and refrain from muscle relaxants as she seems sensitive to them.  Home exercise including stretching on a daily basis for 20-30 minutes was recommended. Heat, ice, topical were discussed as needed. The patient will followup in 6-8 weeks at which point in time if symptoms continue we will order thoracic and lumbar MRI studies to guide treatment plan including possible injection therapy with pain management versus surgical option.

## 2024-10-10 ENCOUNTER — APPOINTMENT (OUTPATIENT)
Dept: PHYSICAL MEDICINE AND REHAB | Facility: CLINIC | Age: 44
End: 2024-10-10

## 2024-11-01 ENCOUNTER — APPOINTMENT (OUTPATIENT)
Dept: PHYSICAL MEDICINE AND REHAB | Facility: CLINIC | Age: 44
End: 2024-11-01

## 2024-11-05 ENCOUNTER — APPOINTMENT (OUTPATIENT)
Dept: ORTHOPEDIC SURGERY | Facility: CLINIC | Age: 44
End: 2024-11-05
Payer: COMMERCIAL

## 2024-11-05 VITALS
DIASTOLIC BLOOD PRESSURE: 76 MMHG | BODY MASS INDEX: 33.31 KG/M2 | WEIGHT: 188 LBS | HEIGHT: 63 IN | HEART RATE: 80 BPM | SYSTOLIC BLOOD PRESSURE: 111 MMHG

## 2024-11-05 DIAGNOSIS — G56.80 OTHER SPECIFIED MONONEUROPATHIES OF UNSPECIFIED UPPER LIMB: ICD-10-CM

## 2024-11-05 DIAGNOSIS — M47.812 SPONDYLOSIS W/OUT MYELOPATHY OR RADICULOPATHY, CERVICAL REGION: ICD-10-CM

## 2024-11-05 DIAGNOSIS — M41.9 SCOLIOSIS, UNSPECIFIED: ICD-10-CM

## 2024-11-05 DIAGNOSIS — M67.952 UNSPECIFIED DISORDER OF SYNOVIUM AND TENDON, LEFT THIGH: ICD-10-CM

## 2024-11-05 DIAGNOSIS — M67.951 UNSPECIFIED DISORDER OF SYNOVIUM AND TENDON, RIGHT THIGH: ICD-10-CM

## 2024-11-05 DIAGNOSIS — M79.7 FIBROMYALGIA: ICD-10-CM

## 2024-11-05 DIAGNOSIS — M54.6 PAIN IN THORACIC SPINE: ICD-10-CM

## 2024-11-05 PROCEDURE — 99214 OFFICE O/P EST MOD 30 MIN: CPT | Mod: 25

## 2024-11-05 PROCEDURE — 72040 X-RAY EXAM NECK SPINE 2-3 VW: CPT

## 2024-11-19 ENCOUNTER — APPOINTMENT (OUTPATIENT)
Dept: MRI IMAGING | Facility: CLINIC | Age: 44
End: 2024-11-19

## 2024-11-20 VITALS
BODY MASS INDEX: 33.31 KG/M2 | DIASTOLIC BLOOD PRESSURE: 75 MMHG | SYSTOLIC BLOOD PRESSURE: 126 MMHG | HEART RATE: 79 BPM | HEIGHT: 63 IN | WEIGHT: 188 LBS

## 2024-11-20 RX ORDER — DIAZEPAM 2 MG/1
2 TABLET ORAL
Qty: 2 | Refills: 0 | Status: ACTIVE | COMMUNITY
Start: 2024-11-20 | End: 1900-01-01

## 2024-11-25 ENCOUNTER — ASC (OUTPATIENT)
Dept: URBAN - METROPOLITAN AREA SURGERY 8 | Facility: SURGERY | Age: 44
Setting detail: OPHTHALMOLOGY
End: 2024-11-25
Payer: COMMERCIAL

## 2024-11-25 ENCOUNTER — OFFICE (OUTPATIENT)
Dept: URBAN - METROPOLITAN AREA CLINIC 94 | Facility: CLINIC | Age: 44
Setting detail: OPHTHALMOLOGY
End: 2024-11-25
Payer: COMMERCIAL

## 2024-11-25 DIAGNOSIS — E11.9: ICD-10-CM

## 2024-11-25 DIAGNOSIS — H34.8320: ICD-10-CM

## 2024-11-25 PROCEDURE — 92014 COMPRE OPH EXAM EST PT 1/>: CPT | Mod: 57 | Performed by: SPECIALIST

## 2024-11-25 PROCEDURE — 92134 CPTRZ OPH DX IMG PST SGM RTA: CPT | Performed by: SPECIALIST

## 2024-11-25 PROCEDURE — 67210 TREATMENT OF RETINAL LESION: CPT | Mod: LT | Performed by: SPECIALIST

## 2024-11-25 PROCEDURE — 92235 FLUORESCEIN ANGRPH MLTIFRAME: CPT | Performed by: SPECIALIST

## 2024-11-25 ASSESSMENT — SUPERFICIAL PUNCTATE KERATITIS (SPK)
OD_SPK: T
OS_SPK: T

## 2024-11-25 ASSESSMENT — CONFRONTATIONAL VISUAL FIELD TEST (CVF)
OS_FINDINGS: FULL
OD_FINDINGS: FULL

## 2024-11-25 ASSESSMENT — REFRACTION_AUTOREFRACTION
OD_AXIS: 166
OD_CYLINDER: -0.75
OS_CYLINDER: -1.50
OS_AXIS: 180
OS_SPHERE: -0.50
OD_SPHERE: -0.25

## 2024-11-25 ASSESSMENT — LID POSITION - COMMENTS
OS_COMMENTS: 10   BROW PTOSIS  8  MM, 3+ DERMATOCHALSIS, TEMPORAL HOODING
OS_COMMENTS: LEVATOR DEHISANCE.    MRD 1:  2 IPD:7   ULE:  &GT
OD_COMMENTS: 10   BROW PTOSIS  8  MM, 3+ DERMATOCHALSIS, TEMPORAL HOODING
OD_COMMENTS: LEVATOR DEHISANCE.    MRD 1:  2 IPD:7   ULE:  &GT

## 2024-11-25 ASSESSMENT — VISUAL ACUITY
OS_BCVA: 20/40-1
OD_BCVA: 20/70+1

## 2024-11-25 ASSESSMENT — KERATOMETRY
OS_K2POWER_DIOPTERS: 46.00
OD_AXISANGLE_DEGREES: 096
OS_K1POWER_DIOPTERS: 39.50
OD_K1POWER_DIOPTERS: 40.00
METHOD_AUTO_MANUAL: AUTO
OS_AXISANGLE_DEGREES: 073
OD_K2POWER_DIOPTERS: 45.75

## 2024-12-05 ENCOUNTER — APPOINTMENT (OUTPATIENT)
Dept: NEUROLOGY | Facility: CLINIC | Age: 44
End: 2024-12-05
Payer: COMMERCIAL

## 2024-12-05 VITALS
BODY MASS INDEX: 33.31 KG/M2 | SYSTOLIC BLOOD PRESSURE: 118 MMHG | HEIGHT: 63 IN | DIASTOLIC BLOOD PRESSURE: 82 MMHG | WEIGHT: 188 LBS

## 2024-12-05 DIAGNOSIS — G43.909 MIGRAINE, UNSPECIFIED, NOT INTRACTABLE, W/OUT STATUS MIGRAINOSUS: ICD-10-CM

## 2024-12-05 DIAGNOSIS — Z87.39 PERSONAL HISTORY OF OTHER DISEASES OF THE MUSCULOSKELETAL SYSTEM AND CONNECTIVE TISSUE: ICD-10-CM

## 2024-12-05 DIAGNOSIS — Z86.39 PERSONAL HISTORY OF OTHER ENDOCRINE, NUTRITIONAL AND METABOLIC DISEASE: ICD-10-CM

## 2024-12-05 PROCEDURE — G2211 COMPLEX E/M VISIT ADD ON: CPT | Mod: NC

## 2024-12-05 PROCEDURE — 99204 OFFICE O/P NEW MOD 45 MIN: CPT

## 2024-12-05 RX ORDER — NORTRIPTYLINE HYDROCHLORIDE 25 MG/1
25 CAPSULE ORAL
Qty: 90 | Refills: 1 | Status: ACTIVE | COMMUNITY
Start: 2024-12-05 | End: 1900-01-01

## 2024-12-09 ENCOUNTER — OFFICE (OUTPATIENT)
Dept: URBAN - METROPOLITAN AREA CLINIC 94 | Facility: CLINIC | Age: 44
Setting detail: OPHTHALMOLOGY
End: 2024-12-09
Payer: COMMERCIAL

## 2024-12-09 DIAGNOSIS — H34.8320: ICD-10-CM

## 2024-12-09 PROCEDURE — 67028 INJECTION EYE DRUG: CPT | Mod: 58,LT | Performed by: SPECIALIST

## 2024-12-09 PROCEDURE — 92134 CPTRZ OPH DX IMG PST SGM RTA: CPT | Performed by: SPECIALIST

## 2024-12-09 ASSESSMENT — VISUAL ACUITY
OD_BCVA: 20/40-1
OS_BCVA: 20/25

## 2024-12-09 ASSESSMENT — KERATOMETRY
METHOD_AUTO_MANUAL: AUTO
OS_AXISANGLE_DEGREES: 073
OD_K1POWER_DIOPTERS: 40.00
OS_K1POWER_DIOPTERS: 39.50
OD_K2POWER_DIOPTERS: 45.75
OS_K2POWER_DIOPTERS: 46.00
OD_AXISANGLE_DEGREES: 096

## 2024-12-09 ASSESSMENT — REFRACTION_AUTOREFRACTION
OD_CYLINDER: -0.75
OD_AXIS: 166
OS_AXIS: 180
OD_SPHERE: -0.25
OS_CYLINDER: -1.50
OS_SPHERE: -0.50

## 2024-12-09 ASSESSMENT — SUPERFICIAL PUNCTATE KERATITIS (SPK)
OS_SPK: T
OD_SPK: T

## 2024-12-09 ASSESSMENT — LID POSITION - COMMENTS
OS_COMMENTS: LEVATOR DEHISANCE.    MRD 1:  2 IPD:7   ULE:  &GT
OD_COMMENTS: 10   BROW PTOSIS  8  MM, 3+ DERMATOCHALSIS, TEMPORAL HOODING
OS_COMMENTS: 10   BROW PTOSIS  8  MM, 3+ DERMATOCHALSIS, TEMPORAL HOODING
OD_COMMENTS: LEVATOR DEHISANCE.    MRD 1:  2 IPD:7   ULE:  &GT

## 2024-12-09 ASSESSMENT — CONFRONTATIONAL VISUAL FIELD TEST (CVF)
OD_FINDINGS: FULL
OS_FINDINGS: FULL

## 2024-12-09 ASSESSMENT — TONOMETRY
OD_IOP_MMHG: 19
OS_IOP_MMHG: 18

## 2025-01-06 ENCOUNTER — OFFICE (OUTPATIENT)
Dept: URBAN - METROPOLITAN AREA CLINIC 116 | Facility: CLINIC | Age: 45
Setting detail: OPHTHALMOLOGY
End: 2025-01-06
Payer: COMMERCIAL

## 2025-01-06 DIAGNOSIS — H04.122: ICD-10-CM

## 2025-01-06 DIAGNOSIS — H04.121: ICD-10-CM

## 2025-01-06 DIAGNOSIS — H11.153: ICD-10-CM

## 2025-01-06 DIAGNOSIS — H04.123: ICD-10-CM

## 2025-01-06 PROCEDURE — 99212 OFFICE O/P EST SF 10 MIN: CPT | Mod: 24 | Performed by: OPTOMETRIST

## 2025-01-06 ASSESSMENT — REFRACTION_MANIFEST
OS_CYLINDER: -1.25
OD_VA1: 20/20
OS_VA1: 20/20
OS_AXIS: 010
OD_SPHERE: PLANO
OS_ADD: +1.50
OS_SPHERE: PLANO
OD_AXIS: 170
OD_ADD: +1.50
OD_CYLINDER: -0.50

## 2025-01-06 ASSESSMENT — KERATOMETRY
OD_K1POWER_DIOPTERS: 40.25
OS_K2POWER_DIOPTERS: 46.25
OD_K2POWER_DIOPTERS: 46.00
OS_K1POWER_DIOPTERS: 39.75
OS_AXISANGLE_DEGREES: 076
OD_AXISANGLE_DEGREES: 101
METHOD_AUTO_MANUAL: AUTO

## 2025-01-06 ASSESSMENT — SUPERFICIAL PUNCTATE KERATITIS (SPK)
OS_SPK: T
OD_SPK: T

## 2025-01-06 ASSESSMENT — REFRACTION_AUTOREFRACTION
OD_AXIS: 172
OS_SPHERE: -0.50
OS_AXIS: 010
OS_CYLINDER: -1.25
OD_CYLINDER: -0.50
OD_SPHERE: -0.25

## 2025-01-06 ASSESSMENT — TONOMETRY
OS_IOP_MMHG: 21
OD_IOP_MMHG: 19

## 2025-01-06 ASSESSMENT — CONFRONTATIONAL VISUAL FIELD TEST (CVF)
OS_FINDINGS: FULL
OD_FINDINGS: FULL

## 2025-01-06 ASSESSMENT — VISUAL ACUITY
OD_BCVA: 20/40-1
OS_BCVA: 20/25

## 2025-01-13 ENCOUNTER — OFFICE (OUTPATIENT)
Dept: URBAN - METROPOLITAN AREA CLINIC 94 | Facility: CLINIC | Age: 45
Setting detail: OPHTHALMOLOGY
End: 2025-01-13
Payer: COMMERCIAL

## 2025-01-13 DIAGNOSIS — H34.8320: ICD-10-CM

## 2025-01-13 PROBLEM — H52.4 PRESBYOPIA: Status: ACTIVE | Noted: 2025-01-06

## 2025-01-13 PROCEDURE — 92134 CPTRZ OPH DX IMG PST SGM RTA: CPT | Performed by: SPECIALIST

## 2025-01-13 PROCEDURE — 99024 POSTOP FOLLOW-UP VISIT: CPT | Performed by: SPECIALIST

## 2025-01-13 ASSESSMENT — LID POSITION - COMMENTS
OD_COMMENTS: LEVATOR DEHISANCE.    MRD 1:  2 IPD:7   ULE:  &GT
OD_COMMENTS: 10   BROW PTOSIS  8  MM, 3+ DERMATOCHALSIS, TEMPORAL HOODING
OS_COMMENTS: LEVATOR DEHISANCE.    MRD 1:  2 IPD:7   ULE:  &GT
OS_COMMENTS: 10   BROW PTOSIS  8  MM, 3+ DERMATOCHALSIS, TEMPORAL HOODING

## 2025-01-13 ASSESSMENT — TONOMETRY
OS_IOP_MMHG: 19
OD_IOP_MMHG: 19

## 2025-01-13 ASSESSMENT — SUPERFICIAL PUNCTATE KERATITIS (SPK)
OD_SPK: T
OS_SPK: T

## 2025-01-13 ASSESSMENT — REFRACTION_AUTOREFRACTION
OD_AXIS: 172
OS_CYLINDER: -1.25
OS_AXIS: 010
OD_CYLINDER: -0.50
OS_SPHERE: -0.50
OD_SPHERE: -0.25

## 2025-01-13 ASSESSMENT — KERATOMETRY
OS_AXISANGLE_DEGREES: 076
OD_K2POWER_DIOPTERS: 46.00
METHOD_AUTO_MANUAL: AUTO
OS_K2POWER_DIOPTERS: 46.25
OD_K1POWER_DIOPTERS: 40.25
OD_AXISANGLE_DEGREES: 101
OS_K1POWER_DIOPTERS: 39.75

## 2025-01-13 ASSESSMENT — VISUAL ACUITY
OS_BCVA: 20/25
OD_BCVA: 20/30-1

## 2025-01-13 ASSESSMENT — CONFRONTATIONAL VISUAL FIELD TEST (CVF)
OS_FINDINGS: FULL
OD_FINDINGS: FULL

## 2025-02-17 ENCOUNTER — OFFICE (OUTPATIENT)
Dept: URBAN - METROPOLITAN AREA CLINIC 94 | Facility: CLINIC | Age: 45
Setting detail: OPHTHALMOLOGY
End: 2025-02-17
Payer: COMMERCIAL

## 2025-02-17 DIAGNOSIS — H34.8320: ICD-10-CM

## 2025-02-17 PROCEDURE — 92134 CPTRZ OPH DX IMG PST SGM RTA: CPT | Performed by: SPECIALIST

## 2025-02-17 PROCEDURE — 67028 INJECTION EYE DRUG: CPT | Mod: 58,LT | Performed by: SPECIALIST

## 2025-02-17 ASSESSMENT — REFRACTION_AUTOREFRACTION
OS_SPHERE: -0.50
OS_CYLINDER: -1.25
OD_CYLINDER: -0.50
OD_SPHERE: -0.25
OD_AXIS: 172
OS_AXIS: 010

## 2025-02-17 ASSESSMENT — SUPERFICIAL PUNCTATE KERATITIS (SPK)
OD_SPK: T
OS_SPK: T

## 2025-02-17 ASSESSMENT — LID POSITION - COMMENTS
OD_COMMENTS: 10   BROW PTOSIS  8  MM, 3+ DERMATOCHALSIS, TEMPORAL HOODING
OS_COMMENTS: 10   BROW PTOSIS  8  MM, 3+ DERMATOCHALSIS, TEMPORAL HOODING
OD_COMMENTS: LEVATOR DEHISANCE.    MRD 1:  2 IPD:7   ULE:  &GT
OS_COMMENTS: LEVATOR DEHISANCE.    MRD 1:  2 IPD:7   ULE:  &GT

## 2025-02-17 ASSESSMENT — CONFRONTATIONAL VISUAL FIELD TEST (CVF)
OS_FINDINGS: FULL
OD_FINDINGS: FULL

## 2025-02-17 ASSESSMENT — KERATOMETRY
OD_AXISANGLE_DEGREES: 101
OS_K1POWER_DIOPTERS: 39.75
OS_AXISANGLE_DEGREES: 076
OS_K2POWER_DIOPTERS: 46.25
OD_K2POWER_DIOPTERS: 46.00
METHOD_AUTO_MANUAL: AUTO
OD_K1POWER_DIOPTERS: 40.25

## 2025-02-17 ASSESSMENT — TONOMETRY
OS_IOP_MMHG: 17
OD_IOP_MMHG: 14

## 2025-02-17 ASSESSMENT — VISUAL ACUITY
OS_BCVA: 20/25-1
OD_BCVA: 20/40-

## 2025-02-18 ENCOUNTER — NON-APPOINTMENT (OUTPATIENT)
Age: 45
End: 2025-02-18

## 2025-02-18 ENCOUNTER — APPOINTMENT (OUTPATIENT)
Dept: OTOLARYNGOLOGY | Facility: CLINIC | Age: 45
End: 2025-02-18
Payer: COMMERCIAL

## 2025-02-18 VITALS — HEIGHT: 63 IN | BODY MASS INDEX: 33.31 KG/M2 | WEIGHT: 188 LBS

## 2025-02-18 VITALS — DIASTOLIC BLOOD PRESSURE: 81 MMHG | SYSTOLIC BLOOD PRESSURE: 119 MMHG | HEART RATE: 76 BPM

## 2025-02-18 DIAGNOSIS — H61.21 IMPACTED CERUMEN, RIGHT EAR: ICD-10-CM

## 2025-02-18 DIAGNOSIS — J32.2 CHRONIC ETHMOIDAL SINUSITIS: ICD-10-CM

## 2025-02-18 DIAGNOSIS — H93.12 TINNITUS, LEFT EAR: ICD-10-CM

## 2025-02-18 DIAGNOSIS — G50.1 ATYPICAL FACIAL PAIN: ICD-10-CM

## 2025-02-18 DIAGNOSIS — Z87.19 PERSONAL HISTORY OF OTHER DISEASES OF THE DIGESTIVE SYSTEM: ICD-10-CM

## 2025-02-18 DIAGNOSIS — R13.12 DYSPHAGIA, OROPHARYNGEAL PHASE: ICD-10-CM

## 2025-02-18 DIAGNOSIS — H69.93 UNSPECIFIED EUSTACHIAN TUBE DISORDER, BILATERAL: ICD-10-CM

## 2025-02-18 DIAGNOSIS — H90.3 SENSORINEURAL HEARING LOSS, BILATERAL: ICD-10-CM

## 2025-02-18 PROCEDURE — 31231 NASAL ENDOSCOPY DX: CPT

## 2025-02-18 PROCEDURE — 92567 TYMPANOMETRY: CPT

## 2025-02-18 PROCEDURE — 99204 OFFICE O/P NEW MOD 45 MIN: CPT | Mod: 25

## 2025-02-18 PROCEDURE — 92557 COMPREHENSIVE HEARING TEST: CPT

## 2025-02-18 RX ORDER — SENNOSIDES 8.6 MG TABLETS 8.6 MG/1
8.6 TABLET ORAL
Qty: 120 | Refills: 0 | Status: COMPLETED | COMMUNITY
Start: 2024-11-06

## 2025-02-18 RX ORDER — GABAPENTIN 300 MG/1
300 CAPSULE ORAL
Qty: 30 | Refills: 0 | Status: COMPLETED | COMMUNITY
Start: 2025-02-09

## 2025-02-18 RX ORDER — BLOOD-GLUCOSE METER
W/DEVICE KIT MISCELLANEOUS
Qty: 1 | Refills: 0 | Status: ACTIVE | COMMUNITY
Start: 2025-01-17

## 2025-02-18 RX ORDER — TRIAMCINOLONE ACETONIDE 1 MG/G
0.1 CREAM TOPICAL
Qty: 80 | Refills: 0 | Status: COMPLETED | COMMUNITY
Start: 2025-01-02

## 2025-02-18 RX ORDER — TAMSULOSIN HYDROCHLORIDE 0.4 MG/1
0.4 CAPSULE ORAL
Qty: 90 | Refills: 0 | Status: COMPLETED | COMMUNITY
Start: 2024-11-19

## 2025-02-18 RX ORDER — BLOOD SUGAR DIAGNOSTIC
STRIP MISCELLANEOUS
Qty: 70 | Refills: 0 | Status: ACTIVE | COMMUNITY
Start: 2025-01-17

## 2025-02-18 RX ORDER — DOCUSATE SODIUM 100 MG/1
100 CAPSULE, LIQUID FILLED ORAL
Qty: 120 | Refills: 0 | Status: COMPLETED | COMMUNITY
Start: 2024-11-06

## 2025-02-28 ENCOUNTER — NON-APPOINTMENT (OUTPATIENT)
Age: 45
End: 2025-02-28

## 2025-02-28 RX ORDER — FLUTICASONE PROPIONATE 50 UG/1
50 SPRAY, METERED NASAL
Qty: 1 | Refills: 5 | Status: ACTIVE | COMMUNITY
Start: 2025-02-28 | End: 1900-01-01

## 2025-03-06 ENCOUNTER — APPOINTMENT (OUTPATIENT)
Dept: ORTHOPEDIC SURGERY | Facility: CLINIC | Age: 45
End: 2025-03-06
Payer: COMMERCIAL

## 2025-03-06 VITALS
HEART RATE: 79 BPM | SYSTOLIC BLOOD PRESSURE: 109 MMHG | HEIGHT: 63 IN | BODY MASS INDEX: 33.31 KG/M2 | DIASTOLIC BLOOD PRESSURE: 73 MMHG | WEIGHT: 188 LBS

## 2025-03-06 DIAGNOSIS — G56.80 OTHER SPECIFIED MONONEUROPATHIES OF UNSPECIFIED UPPER LIMB: ICD-10-CM

## 2025-03-06 DIAGNOSIS — M41.9 SCOLIOSIS, UNSPECIFIED: ICD-10-CM

## 2025-03-06 DIAGNOSIS — M47.812 SPONDYLOSIS W/OUT MYELOPATHY OR RADICULOPATHY, CERVICAL REGION: ICD-10-CM

## 2025-03-06 PROCEDURE — 99214 OFFICE O/P EST MOD 30 MIN: CPT

## 2025-03-10 ENCOUNTER — OFFICE (OUTPATIENT)
Dept: URBAN - METROPOLITAN AREA CLINIC 94 | Facility: CLINIC | Age: 45
Setting detail: OPHTHALMOLOGY
End: 2025-03-10
Payer: COMMERCIAL

## 2025-03-10 DIAGNOSIS — H34.8320: ICD-10-CM

## 2025-03-10 PROCEDURE — 92012 INTRM OPH EXAM EST PATIENT: CPT | Mod: 57 | Performed by: SPECIALIST

## 2025-03-10 PROCEDURE — 92134 CPTRZ OPH DX IMG PST SGM RTA: CPT | Performed by: SPECIALIST

## 2025-03-10 PROCEDURE — 67210 TREATMENT OF RETINAL LESION: CPT | Mod: LT | Performed by: SPECIALIST

## 2025-03-10 ASSESSMENT — LID POSITION - COMMENTS
OS_COMMENTS: LEVATOR DEHISANCE.    MRD 1:  2 IPD:7   ULE:  &GT
OD_COMMENTS: LEVATOR DEHISANCE.    MRD 1:  2 IPD:7   ULE:  &GT
OS_COMMENTS: 10   BROW PTOSIS  8  MM, 3+ DERMATOCHALSIS, TEMPORAL HOODING
OD_COMMENTS: 10   BROW PTOSIS  8  MM, 3+ DERMATOCHALSIS, TEMPORAL HOODING

## 2025-03-10 ASSESSMENT — TONOMETRY
OD_IOP_MMHG: 15
OS_IOP_MMHG: 14

## 2025-03-10 ASSESSMENT — SUPERFICIAL PUNCTATE KERATITIS (SPK)
OS_SPK: T
OD_SPK: T

## 2025-03-11 ASSESSMENT — REFRACTION_AUTOREFRACTION
OD_AXIS: 172
OD_SPHERE: -0.25
OD_CYLINDER: -0.50
OS_AXIS: 010
OS_CYLINDER: -1.25
OS_SPHERE: -0.50

## 2025-03-11 ASSESSMENT — KERATOMETRY
OD_K1POWER_DIOPTERS: 40.25
METHOD_AUTO_MANUAL: AUTO
OS_AXISANGLE_DEGREES: 076
OS_K2POWER_DIOPTERS: 46.25
OD_AXISANGLE_DEGREES: 101
OD_K2POWER_DIOPTERS: 46.00
OS_K1POWER_DIOPTERS: 39.75

## 2025-03-11 ASSESSMENT — VISUAL ACUITY
OS_BCVA: 20/20
OD_BCVA: 20/40

## 2025-03-14 ENCOUNTER — APPOINTMENT (OUTPATIENT)
Dept: NEUROLOGY | Facility: CLINIC | Age: 45
End: 2025-03-14
Payer: COMMERCIAL

## 2025-03-14 ENCOUNTER — APPOINTMENT (OUTPATIENT)
Dept: RHEUMATOLOGY | Facility: CLINIC | Age: 45
End: 2025-03-14

## 2025-03-14 VITALS
HEART RATE: 80 BPM | BODY MASS INDEX: 33.31 KG/M2 | DIASTOLIC BLOOD PRESSURE: 74 MMHG | SYSTOLIC BLOOD PRESSURE: 114 MMHG | HEIGHT: 63 IN | WEIGHT: 188 LBS

## 2025-03-14 DIAGNOSIS — G43.909 MIGRAINE, UNSPECIFIED, NOT INTRACTABLE, W/OUT STATUS MIGRAINOSUS: ICD-10-CM

## 2025-03-14 PROCEDURE — 99213 OFFICE O/P EST LOW 20 MIN: CPT

## 2025-03-14 PROCEDURE — G2211 COMPLEX E/M VISIT ADD ON: CPT | Mod: NC

## 2025-03-14 RX ORDER — TOPIRAMATE 25 MG/1
25 TABLET, FILM COATED ORAL
Qty: 90 | Refills: 1 | Status: ACTIVE | COMMUNITY
Start: 2025-03-14 | End: 1900-01-01

## 2025-06-10 ENCOUNTER — OFFICE (OUTPATIENT)
Dept: URBAN - METROPOLITAN AREA CLINIC 94 | Facility: CLINIC | Age: 45
Setting detail: OPHTHALMOLOGY
End: 2025-06-10
Payer: COMMERCIAL

## 2025-06-10 DIAGNOSIS — H34.8320: ICD-10-CM

## 2025-06-10 PROCEDURE — 92134 CPTRZ OPH DX IMG PST SGM RTA: CPT | Performed by: OPHTHALMOLOGY

## 2025-06-10 PROCEDURE — 67028 INJECTION EYE DRUG: CPT | Mod: LT | Performed by: OPHTHALMOLOGY

## 2025-06-10 ASSESSMENT — REFRACTION_AUTOREFRACTION
OD_SPHERE: -0.25
OS_CYLINDER: -1.25
OS_AXIS: 010
OD_CYLINDER: -0.50
OS_SPHERE: -0.50
OD_AXIS: 172

## 2025-06-10 ASSESSMENT — SUPERFICIAL PUNCTATE KERATITIS (SPK)
OS_SPK: T
OD_SPK: T

## 2025-06-10 ASSESSMENT — TONOMETRY
OS_IOP_MMHG: 17
OD_IOP_MMHG: 18

## 2025-06-10 ASSESSMENT — KERATOMETRY
OD_K2POWER_DIOPTERS: 46.00
OD_AXISANGLE_DEGREES: 101
OS_K2POWER_DIOPTERS: 46.25
OD_K1POWER_DIOPTERS: 40.25
OS_AXISANGLE_DEGREES: 076
OS_K1POWER_DIOPTERS: 39.75
METHOD_AUTO_MANUAL: AUTO

## 2025-06-10 ASSESSMENT — CONFRONTATIONAL VISUAL FIELD TEST (CVF)
OS_FINDINGS: FULL
OD_FINDINGS: FULL

## 2025-06-10 ASSESSMENT — VISUAL ACUITY
OS_BCVA: 20/25-1
OD_BCVA: 20/30+1

## 2025-06-24 ENCOUNTER — OFFICE (OUTPATIENT)
Dept: URBAN - METROPOLITAN AREA CLINIC 94 | Facility: CLINIC | Age: 45
Setting detail: OPHTHALMOLOGY
End: 2025-06-24
Payer: COMMERCIAL

## 2025-06-24 DIAGNOSIS — H04.122: ICD-10-CM

## 2025-06-24 DIAGNOSIS — H04.121: ICD-10-CM

## 2025-06-24 DIAGNOSIS — H04.123: ICD-10-CM

## 2025-06-24 DIAGNOSIS — H11.153: ICD-10-CM

## 2025-06-24 PROCEDURE — 99212 OFFICE O/P EST SF 10 MIN: CPT | Performed by: OPTOMETRIST

## 2025-06-24 ASSESSMENT — TONOMETRY
OS_IOP_MMHG: 21
OD_IOP_MMHG: 15

## 2025-06-24 ASSESSMENT — SUPERFICIAL PUNCTATE KERATITIS (SPK)
OD_SPK: T
OS_SPK: T

## 2025-06-24 ASSESSMENT — KERATOMETRY
OS_K1POWER_DIOPTERS: 39.75
OD_K2POWER_DIOPTERS: 45.75
OD_K1POWER_DIOPTERS: 40.25
OS_K2POWER_DIOPTERS: 46.25
METHOD_AUTO_MANUAL: AUTO
OD_AXISANGLE_DEGREES: 095
OS_AXISANGLE_DEGREES: 073

## 2025-06-24 ASSESSMENT — REFRACTION_CURRENTRX
OD_AXIS: 175
OD_OVR_VA: 20/
OD_CYLINDER: -0.50
OS_AXIS: 011
OD_ADD: +1.50
OS_ADD: +1.50
OS_SPHERE: PLANO
OD_SPHERE: PLANO
OS_OVR_VA: 20/
OS_CYLINDER: -1.25

## 2025-06-24 ASSESSMENT — LID POSITION - COMMENTS
OD_COMMENTS: 10   BROW PTOSIS  8  MM, 3+ DERMATOCHALSIS, TEMPORAL HOODING
OD_COMMENTS: LEVATOR DEHISANCE.    MRD 1:  2 IPD:7   ULE:  &GT
OS_COMMENTS: LEVATOR DEHISANCE.    MRD 1:  2 IPD:7   ULE:  &GT
OS_COMMENTS: 10   BROW PTOSIS  8  MM, 3+ DERMATOCHALSIS, TEMPORAL HOODING

## 2025-06-24 ASSESSMENT — REFRACTION_MANIFEST
OD_VA1: 20/20
OS_ADD: +1.50
OD_ADD: +1.50
OS_CYLINDER: -1.25
OD_CYLINDER: -0.50
OS_SPHERE: PLANO
OD_SPHERE: PLANO
OS_AXIS: 010
OS_VA1: 20/20
OD_AXIS: 170

## 2025-06-24 ASSESSMENT — REFRACTION_AUTOREFRACTION
OD_SPHERE: 0.00
OD_AXIS: 165
OD_CYLINDER: -0.75
OS_CYLINDER: -1.50
OS_AXIS: 004
OS_SPHERE: -0.25

## 2025-06-24 ASSESSMENT — CONFRONTATIONAL VISUAL FIELD TEST (CVF)
OD_FINDINGS: FULL
OS_FINDINGS: FULL

## 2025-06-24 ASSESSMENT — VISUAL ACUITY
OD_BCVA: 20/30+1
OS_BCVA: 20/25+

## 2025-08-06 ENCOUNTER — OFFICE (OUTPATIENT)
Dept: URBAN - METROPOLITAN AREA CLINIC 94 | Facility: CLINIC | Age: 45
Setting detail: OPHTHALMOLOGY
End: 2025-08-06
Payer: COMMERCIAL

## 2025-08-06 DIAGNOSIS — H34.8320: ICD-10-CM

## 2025-08-06 PROBLEM — H35.712 CENTRAL SEROUS RETINOPATHY; LEFT EYE: Status: ACTIVE | Noted: 2025-08-06

## 2025-08-06 PROCEDURE — 92134 CPTRZ OPH DX IMG PST SGM RTA: CPT | Performed by: OPHTHALMOLOGY

## 2025-08-06 PROCEDURE — 67028 INJECTION EYE DRUG: CPT | Mod: LT | Performed by: OPHTHALMOLOGY

## 2025-08-06 ASSESSMENT — KERATOMETRY
OD_AXISANGLE_DEGREES: 095
METHOD_AUTO_MANUAL: AUTO
OD_K1POWER_DIOPTERS: 40.25
OD_K2POWER_DIOPTERS: 45.75
OS_AXISANGLE_DEGREES: 073
OS_K2POWER_DIOPTERS: 46.25
OS_K1POWER_DIOPTERS: 39.75

## 2025-08-06 ASSESSMENT — SUPERFICIAL PUNCTATE KERATITIS (SPK)
OD_SPK: T
OS_SPK: T

## 2025-08-06 ASSESSMENT — REFRACTION_AUTOREFRACTION
OD_SPHERE: 0.00
OD_AXIS: 165
OS_SPHERE: -0.25
OS_AXIS: 004
OS_CYLINDER: -1.50
OD_CYLINDER: -0.75

## 2025-08-06 ASSESSMENT — TONOMETRY
OS_IOP_MMHG: 16
OD_IOP_MMHG: 16

## 2025-08-06 ASSESSMENT — REFRACTION_CURRENTRX
OD_OVR_VA: 20/
OD_CYLINDER: -0.50
OD_SPHERE: PLANO
OD_ADD: +1.50
OS_ADD: +1.50
OS_SPHERE: PLANO
OS_OVR_VA: 20/
OD_AXIS: 175
OS_AXIS: 011
OS_CYLINDER: -1.25

## 2025-08-06 ASSESSMENT — LID POSITION - COMMENTS
OD_COMMENTS: LEVATOR DEHISANCE.    MRD 1:  2 IPD:7   ULE:  &GT
OS_COMMENTS: LEVATOR DEHISANCE.    MRD 1:  2 IPD:7   ULE:  &GT
OS_COMMENTS: 10   BROW PTOSIS  8  MM, 3+ DERMATOCHALSIS, TEMPORAL HOODING
OD_COMMENTS: 10   BROW PTOSIS  8  MM, 3+ DERMATOCHALSIS, TEMPORAL HOODING

## 2025-08-06 ASSESSMENT — VISUAL ACUITY
OS_BCVA: 20/30
OD_BCVA: 20/40

## 2025-08-06 ASSESSMENT — CONFRONTATIONAL VISUAL FIELD TEST (CVF)
OS_FINDINGS: FULL
OD_FINDINGS: FULL